# Patient Record
Sex: FEMALE | Race: WHITE | Employment: FULL TIME | URBAN - METROPOLITAN AREA
[De-identification: names, ages, dates, MRNs, and addresses within clinical notes are randomized per-mention and may not be internally consistent; named-entity substitution may affect disease eponyms.]

---

## 2017-02-02 ENCOUNTER — OFFICE VISIT (OUTPATIENT)
Dept: PERINATAL CARE | Facility: HOSPITAL | Age: 32
End: 2017-02-02
Attending: OBSTETRICS & GYNECOLOGY
Payer: COMMERCIAL

## 2017-02-02 VITALS
DIASTOLIC BLOOD PRESSURE: 59 MMHG | BODY MASS INDEX: 24 KG/M2 | WEIGHT: 142 LBS | HEART RATE: 85 BPM | SYSTOLIC BLOOD PRESSURE: 101 MMHG

## 2017-02-02 DIAGNOSIS — Z98.891 PREVIOUS CESAREAN SECTION: Primary | ICD-10-CM

## 2017-02-02 DIAGNOSIS — IMO0001 CHOROID PLEXUS CYST OF FETUS, NOT APPLICABLE OR UNSPECIFIED FETUS: ICD-10-CM

## 2017-02-02 DIAGNOSIS — D56.3 THALASSEMIA MINOR: ICD-10-CM

## 2017-02-02 DIAGNOSIS — O99.012 ANEMIA AFFECTING PREGNANCY IN SECOND TRIMESTER: ICD-10-CM

## 2017-02-02 PROCEDURE — 99242 OFF/OP CONSLTJ NEW/EST SF 20: CPT

## 2017-02-02 PROCEDURE — 83021 HEMOGLOBIN CHROMOTOGRAPHY: CPT | Performed by: OBSTETRICS & GYNECOLOGY

## 2017-02-02 NOTE — PROGRESS NOTES
Outpatient Maternal-Fetal Medicine Consultation    Dear Dr. Anderson Hernandez    Thank you for requesting ultrasound evaluation and maternal fetal medicine consultation on your patient Collins Nathan.   As you are aware she is a 34year old female  with a [place] 27.0-33.2 pg     MCHC 31.0 31.0-37.0 g/dL     Platelet Count 247 462-210 10*3/uL     RDW 16.6 (H) 11.5-16.0 %     MPV 11.6 (H) 7.0-11.5 fL     Neutrophils Absolute 6.75 (H) 1.30-6.70 10ˆ3/µL     Lymphocytes Absolute 2.93 0.90-4.00 10*3/uL     Monocytes Abs gastrointestinal tract, kidneys, bladder, extremities.     Brain: Visualized and normal appearance: brain parenchyma, cerebral ventricles, choroid plexus, Cisterna Magna, midline falx, cerebellum, cerebellar lobes, posterior fossa, vermis, cavum septi pellu genetic testing is offered. Edvin Palma BETA THALASSEMIA  Beta thalassemia minor (beta thalassemia trait) requires no specific therapy. Transfusions are occasionally required in pregnant women who may develop a more severe \"physiologic\" anemia of pregnancy. coordination of care. Our discussion is summarized above. The approximate physician face-to-face time was 30 minutes.

## 2017-03-02 PROBLEM — M53.3 SACROILIAC JOINT DYSFUNCTION: Status: ACTIVE | Noted: 2017-03-02

## 2017-03-27 PROCEDURE — 86780 TREPONEMA PALLIDUM: CPT | Performed by: OBSTETRICS & GYNECOLOGY

## 2017-03-27 PROCEDURE — 82950 GLUCOSE TEST: CPT | Performed by: OBSTETRICS & GYNECOLOGY

## 2017-03-30 PROCEDURE — 36415 COLL VENOUS BLD VENIPUNCTURE: CPT | Performed by: OBSTETRICS & GYNECOLOGY

## 2017-03-30 PROCEDURE — 82952 GTT-ADDED SAMPLES: CPT | Performed by: OBSTETRICS & GYNECOLOGY

## 2017-03-30 PROCEDURE — 82951 GLUCOSE TOLERANCE TEST (GTT): CPT | Performed by: OBSTETRICS & GYNECOLOGY

## 2017-04-05 ENCOUNTER — OFFICE VISIT (OUTPATIENT)
Dept: PERINATAL CARE | Facility: HOSPITAL | Age: 32
End: 2017-04-05
Attending: OBSTETRICS & GYNECOLOGY
Payer: COMMERCIAL

## 2017-04-05 VITALS
SYSTOLIC BLOOD PRESSURE: 103 MMHG | BODY MASS INDEX: 27 KG/M2 | DIASTOLIC BLOOD PRESSURE: 65 MMHG | HEART RATE: 74 BPM | WEIGHT: 159 LBS

## 2017-04-05 DIAGNOSIS — IMO0001 CHOROID PLEXUS CYST OF FETUS, NOT APPLICABLE OR UNSPECIFIED FETUS: ICD-10-CM

## 2017-04-05 DIAGNOSIS — Z98.891 PREVIOUS CESAREAN SECTION: ICD-10-CM

## 2017-04-05 DIAGNOSIS — D56.3 THALASSEMIA MINOR: Primary | ICD-10-CM

## 2017-04-05 PROBLEM — IMO0002 CHOROID PLEXUS CYST OF FETUS: Status: ACTIVE | Noted: 2017-04-05

## 2017-04-05 PROCEDURE — 99213 OFFICE O/P EST LOW 20 MIN: CPT

## 2017-04-05 NOTE — PROGRESS NOTES
Indication: recheck cpc.   ____________________________________________________________________________  History: Age: 34 years.  : 3 Para: 1.  ____________________________________________________________________________  Dating:  LMP: 09/15/2016 EDC abnormalities are seen today. The choroid plexus cyst not seen. She understands that ultrasound exam cannot exclude genetic abnormalities. The limitations of ultrasound were discussed. IMPRESSION:    1.  IUP at  28 6/7 wks    2.   Scan consistent wi

## 2017-04-10 PROBLEM — O99.283 HYPERTHYROIDISM AFFECTING PREGNANCY IN THIRD TRIMESTER (HCC): Status: ACTIVE | Noted: 2017-04-10

## 2017-04-10 PROBLEM — E05.90 HYPERTHYROIDISM AFFECTING PREGNANCY IN THIRD TRIMESTER (HCC): Status: ACTIVE | Noted: 2017-04-10

## 2017-04-10 PROBLEM — O99.283 HYPERTHYROIDISM AFFECTING PREGNANCY IN THIRD TRIMESTER: Status: ACTIVE | Noted: 2017-04-10

## 2017-04-10 PROBLEM — E05.90 HYPERTHYROIDISM AFFECTING PREGNANCY IN THIRD TRIMESTER: Status: ACTIVE | Noted: 2017-04-10

## 2017-04-10 PROCEDURE — 86376 MICROSOMAL ANTIBODY EACH: CPT | Performed by: INTERNAL MEDICINE

## 2017-04-10 PROCEDURE — 84436 ASSAY OF TOTAL THYROXINE: CPT | Performed by: INTERNAL MEDICINE

## 2017-04-10 PROCEDURE — 84445 ASSAY OF TSI GLOBULIN: CPT | Performed by: INTERNAL MEDICINE

## 2017-04-10 PROCEDURE — 83520 IMMUNOASSAY QUANT NOS NONAB: CPT | Performed by: INTERNAL MEDICINE

## 2017-04-10 PROCEDURE — 86800 THYROGLOBULIN ANTIBODY: CPT | Performed by: INTERNAL MEDICINE

## 2017-05-26 PROCEDURE — 87081 CULTURE SCREEN ONLY: CPT | Performed by: OBSTETRICS & GYNECOLOGY

## 2017-06-12 ENCOUNTER — TELEPHONE (OUTPATIENT)
Dept: OBGYN UNIT | Facility: HOSPITAL | Age: 32
End: 2017-06-12

## 2017-06-12 RX ORDER — GUAIFENESIN 100 MG/5ML
200 SYRUP ORAL 3 TIMES DAILY PRN
COMMUNITY
End: 2017-06-14

## 2017-06-15 NOTE — H&P
[address] and Delivery Prenatal History and Physical Interval Addendum  Please see full Prenatal Record for this pregnancy      SUBJECTIVE:    Interval History: This is a pregnancy at 39 weeks admitted for repeat  section.   Healthy

## 2017-06-16 ENCOUNTER — ANESTHESIA (OUTPATIENT)
Dept: OBGYN UNIT | Facility: HOSPITAL | Age: 32
End: 2017-06-16
Payer: COMMERCIAL

## 2017-06-16 ENCOUNTER — HOSPITAL ENCOUNTER (INPATIENT)
Facility: HOSPITAL | Age: 32
LOS: 3 days | Discharge: HOME | End: 2017-06-19
Attending: OBSTETRICS & GYNECOLOGY | Admitting: OBSTETRICS & GYNECOLOGY
Payer: COMMERCIAL

## 2017-06-16 ENCOUNTER — SURGERY (OUTPATIENT)
Age: 32
End: 2017-06-16

## 2017-06-16 ENCOUNTER — ANESTHESIA EVENT (OUTPATIENT)
Dept: OBGYN UNIT | Facility: HOSPITAL | Age: 32
End: 2017-06-16
Payer: COMMERCIAL

## 2017-06-16 PROBLEM — Z34.90 PREGNANCY (HCC): Status: ACTIVE | Noted: 2017-06-16

## 2017-06-16 PROBLEM — Z34.90 PREGNANCY: Status: ACTIVE | Noted: 2017-06-16

## 2017-06-16 PROCEDURE — 86780 TREPONEMA PALLIDUM: CPT | Performed by: OBSTETRICS & GYNECOLOGY

## 2017-06-16 PROCEDURE — 85025 COMPLETE CBC W/AUTO DIFF WBC: CPT | Performed by: OBSTETRICS & GYNECOLOGY

## 2017-06-16 PROCEDURE — 86901 BLOOD TYPING SEROLOGIC RH(D): CPT | Performed by: OBSTETRICS & GYNECOLOGY

## 2017-06-16 PROCEDURE — 86900 BLOOD TYPING SEROLOGIC ABO: CPT | Performed by: OBSTETRICS & GYNECOLOGY

## 2017-06-16 PROCEDURE — 86850 RBC ANTIBODY SCREEN: CPT | Performed by: OBSTETRICS & GYNECOLOGY

## 2017-06-16 PROCEDURE — 81002 URINALYSIS NONAUTO W/O SCOPE: CPT

## 2017-06-16 RX ORDER — DEXTROSE, SODIUM CHLORIDE, SODIUM LACTATE, POTASSIUM CHLORIDE, AND CALCIUM CHLORIDE 5; .6; .31; .03; .02 G/100ML; G/100ML; G/100ML; G/100ML; G/100ML
INJECTION, SOLUTION INTRAVENOUS CONTINUOUS
Status: DISCONTINUED | OUTPATIENT
Start: 2017-06-16 | End: 2017-06-19

## 2017-06-16 RX ORDER — NALOXONE HYDROCHLORIDE 0.4 MG/ML
0.08 INJECTION, SOLUTION INTRAMUSCULAR; INTRAVENOUS; SUBCUTANEOUS
Status: ACTIVE | OUTPATIENT
Start: 2017-06-16 | End: 2017-06-17

## 2017-06-16 RX ORDER — HYDROMORPHONE HYDROCHLORIDE 1 MG/ML
0.4 INJECTION, SOLUTION INTRAMUSCULAR; INTRAVENOUS; SUBCUTANEOUS EVERY 5 MIN PRN
Status: DISCONTINUED | OUTPATIENT
Start: 2017-06-16 | End: 2017-06-16 | Stop reason: HOSPADM

## 2017-06-16 RX ORDER — DEXTROMETHORPHAN POLISTIREX 30 MG/5ML
10 SUSPENSION ORAL 2 TIMES DAILY PRN
Status: DISCONTINUED | OUTPATIENT
Start: 2017-06-16 | End: 2017-06-16

## 2017-06-16 RX ORDER — DIPHENHYDRAMINE HYDROCHLORIDE 50 MG/ML
25 INJECTION INTRAMUSCULAR; INTRAVENOUS ONCE AS NEEDED
Status: ACTIVE | OUTPATIENT
Start: 2017-06-16 | End: 2017-06-16

## 2017-06-16 RX ORDER — HYDROCODONE BITARTRATE AND ACETAMINOPHEN 5; 325 MG/1; MG/1
1 TABLET ORAL EVERY 4 HOURS PRN
Status: DISCONTINUED | OUTPATIENT
Start: 2017-06-16 | End: 2017-06-19

## 2017-06-16 RX ORDER — OXYTOCIN 10 [USP'U]/ML
INJECTION, SOLUTION INTRAMUSCULAR; INTRAVENOUS
Status: DISCONTINUED | OUTPATIENT
Start: 2017-06-16 | End: 2017-06-16 | Stop reason: HOSPADM

## 2017-06-16 RX ORDER — GUAIFENESIN/DEXTROMETHORPHAN 100-10MG/5
100 SYRUP ORAL EVERY 4 HOURS PRN
Status: DISCONTINUED | OUTPATIENT
Start: 2017-06-16 | End: 2017-06-19

## 2017-06-16 RX ORDER — HYDROMORPHONE HYDROCHLORIDE 1 MG/ML
0.4 INJECTION, SOLUTION INTRAMUSCULAR; INTRAVENOUS; SUBCUTANEOUS EVERY 2 HOUR PRN
Status: ACTIVE | OUTPATIENT
Start: 2017-06-16 | End: 2017-06-17

## 2017-06-16 RX ORDER — DIPHENHYDRAMINE HYDROCHLORIDE 50 MG/ML
12.5 INJECTION INTRAMUSCULAR; INTRAVENOUS EVERY 4 HOURS PRN
Status: DISCONTINUED | OUTPATIENT
Start: 2017-06-16 | End: 2017-06-19

## 2017-06-16 RX ORDER — CEFAZOLIN SODIUM 1 G/3ML
INJECTION, POWDER, FOR SOLUTION INTRAMUSCULAR; INTRAVENOUS
Status: DISCONTINUED | OUTPATIENT
Start: 2017-06-16 | End: 2017-06-16 | Stop reason: HOSPADM

## 2017-06-16 RX ORDER — IBUPROFEN 600 MG/1
600 TABLET ORAL EVERY 6 HOURS SCHEDULED
Status: DISCONTINUED | OUTPATIENT
Start: 2017-06-17 | End: 2017-06-19

## 2017-06-16 RX ORDER — KETOROLAC TROMETHAMINE 30 MG/ML
30 INJECTION, SOLUTION INTRAMUSCULAR; INTRAVENOUS EVERY 6 HOURS PRN
Status: DISPENSED | OUTPATIENT
Start: 2017-06-16 | End: 2017-06-17

## 2017-06-16 RX ORDER — METOCLOPRAMIDE HYDROCHLORIDE 5 MG/ML
INJECTION INTRAMUSCULAR; INTRAVENOUS
Status: DISCONTINUED | OUTPATIENT
Start: 2017-06-16 | End: 2017-06-16 | Stop reason: HOSPADM

## 2017-06-16 RX ORDER — DIPHENHYDRAMINE HCL 25 MG
25 CAPSULE ORAL EVERY 4 HOURS PRN
Status: DISCONTINUED | OUTPATIENT
Start: 2017-06-16 | End: 2017-06-19

## 2017-06-16 RX ORDER — ZOLPIDEM TARTRATE 5 MG/1
5 TABLET ORAL NIGHTLY PRN
Status: DISCONTINUED | OUTPATIENT
Start: 2017-06-16 | End: 2017-06-19

## 2017-06-16 RX ORDER — SIMETHICONE 80 MG
80 TABLET,CHEWABLE ORAL 3 TIMES DAILY PRN
Status: DISCONTINUED | OUTPATIENT
Start: 2017-06-16 | End: 2017-06-19

## 2017-06-16 RX ORDER — SODIUM CHLORIDE, SODIUM LACTATE, POTASSIUM CHLORIDE, CALCIUM CHLORIDE 600; 310; 30; 20 MG/100ML; MG/100ML; MG/100ML; MG/100ML
INJECTION, SOLUTION INTRAVENOUS CONTINUOUS
Status: DISCONTINUED | OUTPATIENT
Start: 2017-06-16 | End: 2017-06-19

## 2017-06-16 RX ORDER — DOCUSATE SODIUM 100 MG/1
100 CAPSULE, LIQUID FILLED ORAL
Status: DISCONTINUED | OUTPATIENT
Start: 2017-06-17 | End: 2017-06-19

## 2017-06-16 RX ORDER — MORPHINE SULFATE 0.5 MG/ML
0.2 INJECTION, SOLUTION EPIDURAL; INTRATHECAL; INTRAVENOUS ONCE
Status: DISCONTINUED | OUTPATIENT
Start: 2017-06-16 | End: 2017-06-16 | Stop reason: HOSPADM

## 2017-06-16 RX ORDER — SODIUM CHLORIDE, SODIUM LACTATE, POTASSIUM CHLORIDE, CALCIUM CHLORIDE 600; 310; 30; 20 MG/100ML; MG/100ML; MG/100ML; MG/100ML
INJECTION, SOLUTION INTRAVENOUS CONTINUOUS
Status: DISCONTINUED | OUTPATIENT
Start: 2017-06-16 | End: 2017-06-16 | Stop reason: HOSPADM

## 2017-06-16 RX ORDER — METOCLOPRAMIDE HYDROCHLORIDE 5 MG/ML
10 INJECTION INTRAMUSCULAR; INTRAVENOUS EVERY 6 HOURS PRN
Status: DISCONTINUED | OUTPATIENT
Start: 2017-06-16 | End: 2017-06-19

## 2017-06-16 RX ORDER — NALBUPHINE HCL 10 MG/ML
2.5 AMPUL (ML) INJECTION
Status: DISCONTINUED | OUTPATIENT
Start: 2017-06-16 | End: 2017-06-16 | Stop reason: HOSPADM

## 2017-06-16 RX ORDER — NALBUPHINE HCL 10 MG/ML
2.5 AMPUL (ML) INJECTION EVERY 4 HOURS PRN
Status: DISCONTINUED | OUTPATIENT
Start: 2017-06-16 | End: 2017-06-19

## 2017-06-16 RX ORDER — HYDROCODONE BITARTRATE AND ACETAMINOPHEN 10; 325 MG/1; MG/1
1 TABLET ORAL EVERY 4 HOURS PRN
Status: DISCONTINUED | OUTPATIENT
Start: 2017-06-16 | End: 2017-06-19

## 2017-06-16 RX ORDER — ONDANSETRON 2 MG/ML
4 INJECTION INTRAMUSCULAR; INTRAVENOUS ONCE AS NEEDED
Status: ACTIVE | OUTPATIENT
Start: 2017-06-16 | End: 2017-06-16

## 2017-06-16 RX ORDER — BISACODYL 10 MG
10 SUPPOSITORY, RECTAL RECTAL
Status: DISCONTINUED | OUTPATIENT
Start: 2017-06-16 | End: 2017-06-19

## 2017-06-16 RX ORDER — KETOROLAC TROMETHAMINE 30 MG/ML
30 INJECTION, SOLUTION INTRAMUSCULAR; INTRAVENOUS ONCE AS NEEDED
Status: COMPLETED | OUTPATIENT
Start: 2017-06-16 | End: 2017-06-16

## 2017-06-16 RX ORDER — ONDANSETRON 2 MG/ML
4 INJECTION INTRAMUSCULAR; INTRAVENOUS EVERY 6 HOURS PRN
Status: DISCONTINUED | OUTPATIENT
Start: 2017-06-16 | End: 2017-06-19

## 2017-06-16 NOTE — PROGRESS NOTES
Pt is a 34year old female admitted to triage 4for Patient presents with:  Scheduled   . Pt is 39w1d intra-uterine pregnancy. History obtained, consents signed. Oriented to room, staff, and plan of care.

## 2017-06-16 NOTE — PLAN OF CARE
Problem: SAFETY ADULT - FALL  Goal: Free from fall injury  INTERVENTIONS:  - Assess pt frequently for physical needs  - Identify cognitive and physical deficits and behaviors that affect risk of falls.   - [place] fall precautions as indicated by assessme

## 2017-06-16 NOTE — ANESTHESIA PREPROCEDURE EVALUATION
PRE-OP EVALUATION    Patient Name: Mary Alberts    Pre-op Diagnosis: *S/P C/S @ 39+WKS    Procedure(s):  ELECTIVE REPEAT     Surgeon(s) and Role:     Cristobal Tovar MD - Primary      Pre-op vitals reviewed.   Temp: 99 °F (37.2 °C)  Puls Smoking status: Never Smoker     Smokeless tobacco: Never Used    Alcohol Use: No       Drug Use: No     Available pre-op labs reviewed.     Lab Results  Component Value Date   WBC 13.9* 06/16/2017   RBC 5.43* 06/16/2017   HGB 10.9* 06/16/2017   HGB

## 2017-06-16 NOTE — OPERATIVE REPORT
PREOPERATIVE DIAGNOSIS:   1. 39 week pregnancy  2. Prior      POSTOPERATIVE DIAGNOSIS:   same    PROCEDURE PERFORMED: repeat low transverse  section.      SURGEON: David Baker MD    ASSISTANT: Colin Emerson MD    ANESTHESIA: Spinal.     F a   continuous running fashion. Subcutaneous tissue was irrigated,   dried, and found to be hemostatic. 2-0 plain gut used to reapproximate the subcutaneous fat. Skin was closed with 4-0 Vicryl   in a subcuticular fashion.  Mastisol and Steri-Strips were ap

## 2017-06-16 NOTE — ANESTHESIA POSTPROCEDURE EVALUATION
[address] Patient Status:  Inpatient   Age/Gender 34year old female MRN [id number]   [facility] [place] 1NW-A Attending Kayode Hedrick MD   [facility] Day # 0 PCP Emily Roth MD       Anesthesia Post-op Note    Procedur

## 2017-06-16 NOTE — PROGRESS NOTES
Patient transferred to mother/baby room per cart in stable condition with baby and personal belongings. Accompanied by  and staff. Report given to mother/baby RN.

## 2017-06-17 PROCEDURE — 85025 COMPLETE CBC W/AUTO DIFF WBC: CPT | Performed by: OBSTETRICS & GYNECOLOGY

## 2017-06-17 PROCEDURE — 87086 URINE CULTURE/COLONY COUNT: CPT | Performed by: OBSTETRICS & GYNECOLOGY

## 2017-06-17 PROCEDURE — 81001 URINALYSIS AUTO W/SCOPE: CPT | Performed by: OBSTETRICS & GYNECOLOGY

## 2017-06-17 NOTE — PROGRESS NOTES
ASSISTED PT UP TO BR TO VOID WITHOUT DIFFICULTY. SEE I&O FLOW SHEET FOR AMOUNT OF VOIDED URINE. PERICARE REINFORCED. PT TOLERATED UP WELL.

## 2017-06-17 NOTE — PROGRESS NOTES
[facility]    Patients Name: Mary Alberts  Attending Physician: Denver Ceja MD  CSN: [id number]    Location:  2216/2216-A  MRN: [id number]    YOB: 1985  Admission Date: 6/16/2017     Obstetric Anesthesia Pain Progress Note

## 2017-06-17 NOTE — PROGRESS NOTES
[facility]  Post-Partum Caesarean Section Progress Note    Nataly Daniels Patient Status:  Inpatient    1985 MRN [id number]   [facility] 2SW-J Attending Tyson Stewart MD   Hosp Day # 1 PCP Shruti Rao MD     [address]

## 2017-06-18 NOTE — PROGRESS NOTES
[facility]  Post-Partum Caesarean Section Progress Note    Vinita Parisi Patient Status:  Inpatient    1985 MRN [id number]   [facility] 2SW-J Attending Windy Gordon MD   Hosp Day # 2 PCP Rose Foster MD     [address]

## 2017-06-19 VITALS
RESPIRATION RATE: 18 BRPM | DIASTOLIC BLOOD PRESSURE: 76 MMHG | OXYGEN SATURATION: 96 % | SYSTOLIC BLOOD PRESSURE: 131 MMHG | TEMPERATURE: 98 F | HEIGHT: 64.02 IN | HEART RATE: 55 BPM

## 2017-06-19 RX ORDER — HYDROCODONE BITARTRATE AND ACETAMINOPHEN 5; 325 MG/1; MG/1
1-2 TABLET ORAL EVERY 4 HOURS PRN
Qty: 30 TABLET | Refills: 0 | Status: SHIPPED | OUTPATIENT
Start: 2017-06-19 | End: 2017-06-30

## 2017-06-19 NOTE — PROGRESS NOTES
S: pt without complaints.   Positive flatus  O: VS-Temp:  [98.2 °F (36.8 °C)-98.7 °F (37.1 °C)] 98.2 °F (36.8 °C)  Pulse:  [75-76] 76  Resp:  [17-18] 18  BP: (119-132)/(69-81) 132/81 mmHg       Abdomen: soft, ND, NT  Incision- CDI       Extremities: tr nayeli

## 2017-06-19 NOTE — PROGRESS NOTES
[facility] with spouse,  and PCT escort. Pt verbalizes understanding of all discharge instructions at this time.

## 2017-06-20 PROBLEM — IMO0002 CHOROID PLEXUS CYST OF FETUS: Status: RESOLVED | Noted: 2017-04-05 | Resolved: 2017-06-16

## 2017-06-22 NOTE — DISCHARGE SUMMARY
Admission date: 17  Discharge date: 17  Admission diagnosis: term pregnancy, previous  section  Discharge diagnosis: same  Operative Procedure: repeat low transverse  section  Hospital course: uncomplicated  Discharge medications:

## 2017-06-24 ENCOUNTER — TELEPHONE (OUTPATIENT)
Dept: OBGYN UNIT | Facility: HOSPITAL | Age: 32
End: 2017-06-24

## 2017-06-24 NOTE — PROGRESS NOTES
Outgoing cradle call completed. Mom reports that she and infant are doing well. No complaints of PPB or PPD. Has had pediatrician F/U visit. Has PP F/U visit scheduled. Reviewed basic infant and self care; verbalizes understanding. Encouraged to follow-up with MDs as directed with any further health questions regarding herself and infant.

## 2017-06-28 ENCOUNTER — NURSE ONLY (OUTPATIENT)
Dept: LACTATION | Facility: HOSPITAL | Age: 32
End: 2017-06-28
Attending: OBSTETRICS & GYNECOLOGY
Payer: COMMERCIAL

## 2017-06-28 PROCEDURE — 99213 OFFICE O/P EST LOW 20 MIN: CPT

## 2017-06-28 NOTE — PATIENT INSTRUCTIONS
Outpatient Lactation Visit for Yadira Hernandez  June 28, 2017    Today's weight in diaper only: 8 lb 11.3 oz  Intake from both breasts: 46 ml right breast, 12 ml left = 58 ml, almost 2 ounces  At her current weight, an average feeding might be ab

## 2017-07-08 ENCOUNTER — NURSE ONLY (OUTPATIENT)
Dept: LACTATION | Facility: HOSPITAL | Age: 32
End: 2017-07-08
Attending: OBSTETRICS & GYNECOLOGY
Payer: COMMERCIAL

## 2017-07-08 PROCEDURE — 99212 OFFICE O/P EST SF 10 MIN: CPT

## 2017-07-31 PROCEDURE — 87624 HPV HI-RISK TYP POOLED RSLT: CPT | Performed by: OBSTETRICS & GYNECOLOGY

## 2017-07-31 PROCEDURE — 88175 CYTOPATH C/V AUTO FLUID REDO: CPT | Performed by: OBSTETRICS & GYNECOLOGY

## 2017-10-05 PROBLEM — R10.2 PELVIC PAIN IN FEMALE: Status: ACTIVE | Noted: 2017-10-05

## 2018-01-04 PROBLEM — E05.90 HYPERTHYROIDISM AFFECTING PREGNANCY IN THIRD TRIMESTER (HCC): Status: RESOLVED | Noted: 2017-04-10 | Resolved: 2018-01-04

## 2018-01-04 PROBLEM — O99.283 HYPERTHYROIDISM AFFECTING PREGNANCY IN THIRD TRIMESTER: Status: RESOLVED | Noted: 2017-04-10 | Resolved: 2018-01-04

## 2018-01-04 PROBLEM — O99.283 HYPERTHYROIDISM AFFECTING PREGNANCY IN THIRD TRIMESTER (HCC): Status: RESOLVED | Noted: 2017-04-10 | Resolved: 2018-01-04

## 2018-01-04 PROBLEM — E05.90 SUBCLINICAL HYPERTHYROIDISM: Status: ACTIVE | Noted: 2018-01-04

## 2018-01-04 PROBLEM — E05.90 HYPERTHYROIDISM AFFECTING PREGNANCY IN THIRD TRIMESTER: Status: RESOLVED | Noted: 2017-04-10 | Resolved: 2018-01-04

## 2018-01-05 PROCEDURE — 83520 IMMUNOASSAY QUANT NOS NONAB: CPT | Performed by: INTERNAL MEDICINE

## 2018-01-05 PROCEDURE — 86800 THYROGLOBULIN ANTIBODY: CPT | Performed by: INTERNAL MEDICINE

## 2018-01-05 PROCEDURE — 84445 ASSAY OF TSI GLOBULIN: CPT | Performed by: INTERNAL MEDICINE

## 2018-01-05 PROCEDURE — 86376 MICROSOMAL ANTIBODY EACH: CPT | Performed by: INTERNAL MEDICINE

## 2018-05-08 ENCOUNTER — OFFICE VISIT (OUTPATIENT)
Dept: FAMILY MEDICINE CLINIC | Facility: CLINIC | Age: 33
End: 2018-05-08

## 2018-05-08 VITALS
TEMPERATURE: 98 F | RESPIRATION RATE: 14 BRPM | OXYGEN SATURATION: 97 % | HEART RATE: 85 BPM | DIASTOLIC BLOOD PRESSURE: 64 MMHG | WEIGHT: 140 LBS | HEIGHT: 63 IN | SYSTOLIC BLOOD PRESSURE: 110 MMHG | BODY MASS INDEX: 24.8 KG/M2

## 2018-05-08 DIAGNOSIS — H66.001 ACUTE SUPPURATIVE OTITIS MEDIA OF RIGHT EAR WITHOUT SPONTANEOUS RUPTURE OF TYMPANIC MEMBRANE, RECURRENCE NOT SPECIFIED: Primary | ICD-10-CM

## 2018-05-08 DIAGNOSIS — J00 ACUTE NASOPHARYNGITIS: ICD-10-CM

## 2018-05-08 DIAGNOSIS — H65.192 OTHER ACUTE NONSUPPURATIVE OTITIS MEDIA OF LEFT EAR, RECURRENCE NOT SPECIFIED: ICD-10-CM

## 2018-05-08 PROCEDURE — 99202 OFFICE O/P NEW SF 15 MIN: CPT | Performed by: NURSE PRACTITIONER

## 2018-05-08 RX ORDER — FLUTICASONE PROPIONATE 50 MCG
2 SPRAY, SUSPENSION (ML) NASAL DAILY
Qty: 1 BOTTLE | Refills: 0 | Status: SHIPPED | OUTPATIENT
Start: 2018-05-08 | End: 2018-05-22

## 2018-05-08 RX ORDER — AMOXICILLIN 875 MG/1
875 TABLET, COATED ORAL 2 TIMES DAILY
Qty: 20 TABLET | Refills: 0 | Status: SHIPPED | OUTPATIENT
Start: 2018-05-08 | End: 2018-05-18

## 2018-05-08 NOTE — PROGRESS NOTES
CHIEF COMPLAINT:   Patient presents with:  Ear Pain: pain in right ear, sinus pressure right side x 2 dys constant ringing in ear x 1 dy       HPI:   Clarissa Amezcua is a 28year old female who presents to clinic today with complaints of right ear landmarks not visualized. Left TM: erythematous, + bulging, no retraction, no effusion, bony landmarks not visualized.    NOSE: nostrils patent, clear nasal discharge, nasal mucosa pink and noninflamed  THROAT: oral mucosa pink, moist. Posterior pharynx hours.          Middle Ear Infection (Otitis Media)   What is a middle ear infection? A middle ear infection is an infection of the air-filled space in the ear behind the eardrum.  Anyone can get an ear infection, but ear infections are more common in chi decongestant (tablets or a nasal spray) to help clear the eustachian tube. This may help relieve pressure in the middle ear. For pain take a nonprescription pain reliever such as acetaminophen (Tylenol) or ibuprofen.  Carefully follow the directions for usi (38.6 degrees C) or higher that persists even after you take acetaminophen, aspirin, or ibuprofen   a severe headache or worsening pain around the ear   swelling around the ear   increasing dizziness   worsening of your hearing   weakness of one side of yo

## 2019-02-23 ENCOUNTER — OFFICE VISIT (OUTPATIENT)
Dept: FAMILY MEDICINE CLINIC | Facility: CLINIC | Age: 34
End: 2019-02-23
Payer: COMMERCIAL

## 2019-02-23 VITALS
DIASTOLIC BLOOD PRESSURE: 64 MMHG | RESPIRATION RATE: 20 BRPM | TEMPERATURE: 98 F | WEIGHT: 143.38 LBS | HEIGHT: 64 IN | BODY MASS INDEX: 24.48 KG/M2 | OXYGEN SATURATION: 99 % | HEART RATE: 98 BPM | SYSTOLIC BLOOD PRESSURE: 98 MMHG

## 2019-02-23 DIAGNOSIS — J01.00 ACUTE NON-RECURRENT MAXILLARY SINUSITIS: Primary | ICD-10-CM

## 2019-02-23 PROCEDURE — 99213 OFFICE O/P EST LOW 20 MIN: CPT | Performed by: NURSE PRACTITIONER

## 2019-02-23 RX ORDER — FLUTICASONE PROPIONATE 50 MCG
2 SPRAY, SUSPENSION (ML) NASAL DAILY
Qty: 1 INHALER | Refills: 0 | Status: SHIPPED | OUTPATIENT
Start: 2019-02-23 | End: 2020-08-03 | Stop reason: ALTCHOICE

## 2019-02-23 RX ORDER — AMOXICILLIN AND CLAVULANATE POTASSIUM 875; 125 MG/1; MG/1
1 TABLET, FILM COATED ORAL 2 TIMES DAILY
Qty: 20 TABLET | Refills: 0 | Status: SHIPPED | OUTPATIENT
Start: 2019-02-23 | End: 2019-03-05

## 2019-02-23 NOTE — PROGRESS NOTES
CHIEF COMPLAINT:   Patient presents with:  Sinus Problem: sinus congestion, both ear pain, and clogged x4wks    HPI:   Sonya Lehman is a 27year old female who presents for sinus congestion for  4  months. Symptoms have been worsening since onset.  Venida Lamp SKIN: no rashes,no suspicious lesions  HEAD: atraumatic, normocephalic,  + tenderness on palpation of right maxillary sinuses  EYES: conjunctiva clear, EOM intact  EARS: TM's clear gray, no bulging, no retraction, + fluid, bony landmarks intact  NOSE: nost The sinuses are air-filled spaces within the bones of the face. They connect to the inside of the nose. Sinusitis is an inflammation of the tissue that lines the sinuses. Sinusitis can occur during a cold.  It can also happen due to allergies to pollens and · Do not use nasal rinses or irrigation during an acute sinus infection, unless your healthcare provider tells you to. Rinsing may spread the infection to other areas in your sinuses.   · Use acetaminophen or ibuprofen to control pain, unless another pain m

## 2019-10-17 ENCOUNTER — OFFICE VISIT (OUTPATIENT)
Dept: FAMILY MEDICINE CLINIC | Facility: CLINIC | Age: 34
End: 2019-10-17
Payer: COMMERCIAL

## 2019-10-17 VITALS
BODY MASS INDEX: 24.8 KG/M2 | HEART RATE: 84 BPM | WEIGHT: 140 LBS | RESPIRATION RATE: 16 BRPM | TEMPERATURE: 99 F | OXYGEN SATURATION: 98 % | DIASTOLIC BLOOD PRESSURE: 70 MMHG | HEIGHT: 63 IN | SYSTOLIC BLOOD PRESSURE: 118 MMHG

## 2019-10-17 DIAGNOSIS — J02.9 SORE THROAT: ICD-10-CM

## 2019-10-17 DIAGNOSIS — J02.0 STREP THROAT: Primary | ICD-10-CM

## 2019-10-17 PROCEDURE — 87880 STREP A ASSAY W/OPTIC: CPT | Performed by: PHYSICIAN ASSISTANT

## 2019-10-17 PROCEDURE — 99213 OFFICE O/P EST LOW 20 MIN: CPT | Performed by: PHYSICIAN ASSISTANT

## 2019-10-17 RX ORDER — AMOXICILLIN 500 MG/1
500 CAPSULE ORAL 2 TIMES DAILY
Qty: 20 CAPSULE | Refills: 0 | Status: SHIPPED | OUTPATIENT
Start: 2019-10-17 | End: 2019-10-27

## 2019-10-17 NOTE — PROGRESS NOTES
CHIEF COMPLAINT:   Patient presents with:  Sore Throat: body aches x 2 dys       HPI:   Finn Giang is a 27year old female who presents with sore throat. Symptoms have been persistent since onset.  Patient reports no nasal discharge, nocough, no si normocephalic, non tenderness on palpation of maxillary sinuses, no frontal sinus tenderness  EYES: conjunctiva clear, EOM intact  EARS: TM's with no erythema, bulging, or retraction bilaterally, no fluid behind TM's bilaterally, bony landmarks intact  NOS understands and agrees with plan.      Nia Munson PA-C  10/17/2019  4:00 PM

## 2019-10-17 NOTE — PATIENT INSTRUCTIONS
Pharyngitis: Strep (Confirmed)    You have had a positive test for strep throat. Strep throat is a contagious illness. It is spread by coughing, kissing or by touching others after touching your mouth or nose.  Symptoms include throat pain that is worse w · Lymph nodes getting larger or becoming soft in the middle  · You can't swallow liquids or you can't open your mouth wide because of throat pain  · Signs of dehydration. These include very dark urine or no urine, sunken eyes, and dizziness.   · Trouble willy

## 2020-06-30 ENCOUNTER — TELEMEDICINE (OUTPATIENT)
Dept: TELEHEALTH | Age: 35
End: 2020-06-30

## 2020-06-30 DIAGNOSIS — Z02.9 ENCOUNTERS FOR UNSPECIFIED ADMINISTRATIVE PURPOSE: Primary | ICD-10-CM

## 2020-08-03 ENCOUNTER — OFFICE VISIT (OUTPATIENT)
Dept: FAMILY MEDICINE CLINIC | Facility: CLINIC | Age: 35
End: 2020-08-03
Payer: COMMERCIAL

## 2020-08-03 VITALS
DIASTOLIC BLOOD PRESSURE: 70 MMHG | HEIGHT: 63 IN | RESPIRATION RATE: 16 BRPM | BODY MASS INDEX: 22.5 KG/M2 | OXYGEN SATURATION: 98 % | HEART RATE: 68 BPM | WEIGHT: 127 LBS | SYSTOLIC BLOOD PRESSURE: 98 MMHG | TEMPERATURE: 98 F

## 2020-08-03 DIAGNOSIS — J30.2 SEASONAL ALLERGIES: ICD-10-CM

## 2020-08-03 DIAGNOSIS — E05.90 SUBCLINICAL HYPERTHYROIDISM: Primary | ICD-10-CM

## 2020-08-03 DIAGNOSIS — K64.9 HEMORRHOIDS, UNSPECIFIED HEMORRHOID TYPE: ICD-10-CM

## 2020-08-03 DIAGNOSIS — K59.09 OTHER CONSTIPATION: ICD-10-CM

## 2020-08-03 PROCEDURE — 3078F DIAST BP <80 MM HG: CPT | Performed by: FAMILY MEDICINE

## 2020-08-03 PROCEDURE — 3074F SYST BP LT 130 MM HG: CPT | Performed by: FAMILY MEDICINE

## 2020-08-03 PROCEDURE — 99203 OFFICE O/P NEW LOW 30 MIN: CPT | Performed by: FAMILY MEDICINE

## 2020-08-03 PROCEDURE — 3008F BODY MASS INDEX DOCD: CPT | Performed by: FAMILY MEDICINE

## 2020-08-03 NOTE — PROGRESS NOTES
HPI:   Javed Nielsen is a 38year old female who presents to establish care     Pt has h/o subclinical hyperthyroidism - has been monitored for years   No current thyroid symptoms   Labs at work 2/2020     H/o thalassemia minor   Labs have been stabl Wt 127 lb (57.6 kg)   LMP 07/09/2020 (Approximate)   SpO2 98%   BMI 22.50 kg/m²   Body mass index is 22.5 kg/m².    GENERAL: alert and oriented X 3, well developed, well nourished,in no apparent distress  CARDIO: RRR without murmur  LUNGS: clear to auscult

## 2020-08-11 PROBLEM — Z34.90 PREGNANCY (HCC): Status: RESOLVED | Noted: 2017-06-16 | Resolved: 2020-08-11

## 2020-08-11 PROBLEM — Z34.90 PREGNANCY: Status: RESOLVED | Noted: 2017-06-16 | Resolved: 2020-08-11

## 2020-09-08 ENCOUNTER — LAB ENCOUNTER (OUTPATIENT)
Dept: LAB | Facility: HOSPITAL | Age: 35
End: 2020-09-08
Attending: INTERNAL MEDICINE
Payer: COMMERCIAL

## 2020-09-08 DIAGNOSIS — O09.299 HISTORY OF MISCARRIAGE, CURRENTLY PREGNANT: ICD-10-CM

## 2020-09-08 LAB
B-HCG SERPL-ACNC: 2619 MIU/ML
PROGEST SERPL-MCNC: 38.2 NG/ML

## 2020-09-08 PROCEDURE — 84144 ASSAY OF PROGESTERONE: CPT

## 2020-09-08 PROCEDURE — 36415 COLL VENOUS BLD VENIPUNCTURE: CPT

## 2020-09-08 PROCEDURE — 84702 CHORIONIC GONADOTROPIN TEST: CPT

## 2020-09-10 ENCOUNTER — LAB ENCOUNTER (OUTPATIENT)
Dept: LAB | Facility: HOSPITAL | Age: 35
End: 2020-09-10
Attending: OBSTETRICS & GYNECOLOGY
Payer: COMMERCIAL

## 2020-09-10 DIAGNOSIS — O09.299 HISTORY OF MISCARRIAGE, CURRENTLY PREGNANT: ICD-10-CM

## 2020-09-10 LAB — B-HCG SERPL-ACNC: 5722 MIU/ML

## 2020-09-10 PROCEDURE — 84702 CHORIONIC GONADOTROPIN TEST: CPT

## 2020-09-10 PROCEDURE — 36415 COLL VENOUS BLD VENIPUNCTURE: CPT

## 2020-10-22 ENCOUNTER — TELEPHONE (OUTPATIENT)
Dept: FAMILY MEDICINE CLINIC | Facility: CLINIC | Age: 35
End: 2020-10-22

## 2020-10-22 NOTE — TELEPHONE ENCOUNTER
Per Dr. Estela Nova pt needs appt to discuss labs that were faxed to office. LMTCB Copy in upcoming appt folder.

## 2020-10-26 PROBLEM — Z34.81 PRENATAL CARE, SUBSEQUENT PREGNANCY, FIRST TRIMESTER: Status: ACTIVE | Noted: 2020-10-26

## 2020-10-26 PROBLEM — Z34.81 PRENATAL CARE, SUBSEQUENT PREGNANCY, FIRST TRIMESTER (HCC): Status: ACTIVE | Noted: 2020-10-26

## 2020-10-26 PROBLEM — O09.529 ADVANCED MATERNAL AGE IN MULTIGRAVIDA, UNSPECIFIED TRIMESTER (HCC): Status: ACTIVE | Noted: 2020-10-26

## 2020-10-26 PROBLEM — O34.219 PREVIOUS CESAREAN DELIVERY AFFECTING PREGNANCY (HCC): Status: ACTIVE | Noted: 2020-10-26

## 2020-10-26 PROBLEM — O34.219 PREVIOUS CESAREAN DELIVERY AFFECTING PREGNANCY: Status: ACTIVE | Noted: 2020-10-26

## 2020-10-26 PROBLEM — O09.529 ADVANCED MATERNAL AGE IN MULTIGRAVIDA, UNSPECIFIED TRIMESTER: Status: ACTIVE | Noted: 2020-10-26

## 2021-01-27 NOTE — TELEPHONE ENCOUNTER
Pt's labs scanned in chart from October show a low hgb/hct. Pt has been seeing ob/gyn,  They did order repeat labs yesterday. Do you still need to see pt?

## 2021-02-11 DIAGNOSIS — Z23 NEED FOR VACCINATION: ICD-10-CM

## 2021-02-26 ENCOUNTER — LABORATORY ENCOUNTER (OUTPATIENT)
Dept: LAB | Facility: HOSPITAL | Age: 36
End: 2021-02-26
Attending: OBSTETRICS & GYNECOLOGY
Payer: COMMERCIAL

## 2021-02-26 DIAGNOSIS — O99.810 ABNORMAL MATERNAL GLUCOSE TOLERANCE, ANTEPARTUM: ICD-10-CM

## 2021-02-26 LAB
1 HR GLUCOSE GESTATIONAL: 142 MG/DL
GLUCOSE 1H P GLC SERPL-MCNC: 121 MG/DL
GLUCOSE 3H P GLC SERPL-MCNC: 97 MG/DL
GLUCOSE BLD-MCNC: 103 MG/DL (ref 70–99)
GLUCOSE P FAST SERPL-MCNC: 85 MG/DL

## 2021-02-26 PROCEDURE — 82962 GLUCOSE BLOOD TEST: CPT

## 2021-02-26 PROCEDURE — 82951 GLUCOSE TOLERANCE TEST (GTT): CPT

## 2021-02-26 PROCEDURE — 82952 GTT-ADDED SAMPLES: CPT

## 2021-02-26 PROCEDURE — 36415 COLL VENOUS BLD VENIPUNCTURE: CPT

## 2021-03-18 NOTE — PROGRESS NOTES
Amando Cantu     Dear Dr. Zeenat Garcia,     Thank you for requesting ultrasound evaluation and maternal fetal medicine consultation on your patient Dago Katz.   As you are aware she is a 39year old female [id number] with a discussed risks for fetal growth abnormalities, hypertensive complications, maternal hospitalizations,  mortality and  delivery. We discussed the plan of care and the rationale for the increased surveillance.   We reviewed the signs and sy

## 2021-03-25 ENCOUNTER — OFFICE VISIT (OUTPATIENT)
Dept: PERINATAL CARE | Facility: HOSPITAL | Age: 36
End: 2021-03-25
Attending: OBSTETRICS & GYNECOLOGY
Payer: COMMERCIAL

## 2021-03-25 VITALS
HEART RATE: 90 BPM | BODY MASS INDEX: 27.31 KG/M2 | SYSTOLIC BLOOD PRESSURE: 114 MMHG | WEIGHT: 160 LBS | DIASTOLIC BLOOD PRESSURE: 76 MMHG | HEIGHT: 64 IN

## 2021-03-25 DIAGNOSIS — O09.529 ADVANCED MATERNAL AGE IN MULTIGRAVIDA, UNSPECIFIED TRIMESTER: Primary | ICD-10-CM

## 2021-03-25 DIAGNOSIS — O09.529 ADVANCED MATERNAL AGE IN MULTIGRAVIDA, UNSPECIFIED TRIMESTER: ICD-10-CM

## 2021-03-25 DIAGNOSIS — D56.3 THALASSEMIA MINOR: ICD-10-CM

## 2021-03-25 DIAGNOSIS — O34.219 PREVIOUS CESAREAN DELIVERY AFFECTING PREGNANCY: ICD-10-CM

## 2021-03-25 PROCEDURE — 76819 FETAL BIOPHYS PROFIL W/O NST: CPT

## 2021-03-25 PROCEDURE — 76816 OB US FOLLOW-UP PER FETUS: CPT | Performed by: OBSTETRICS & GYNECOLOGY

## 2021-03-25 PROCEDURE — 76819 FETAL BIOPHYS PROFIL W/O NST: CPT | Performed by: OBSTETRICS & GYNECOLOGY

## 2021-03-25 PROCEDURE — 99213 OFFICE O/P EST LOW 20 MIN: CPT | Performed by: OBSTETRICS & GYNECOLOGY

## 2021-04-20 DIAGNOSIS — Z34.90 PREGNANCY: Primary | ICD-10-CM

## 2021-05-03 ENCOUNTER — LAB ENCOUNTER (OUTPATIENT)
Dept: LAB | Facility: HOSPITAL | Age: 36
End: 2021-05-03
Attending: OBSTETRICS & GYNECOLOGY
Payer: COMMERCIAL

## 2021-05-03 DIAGNOSIS — Z34.90 PREGNANCY: ICD-10-CM

## 2021-05-04 ENCOUNTER — TELEPHONE (OUTPATIENT)
Dept: OBGYN UNIT | Facility: HOSPITAL | Age: 36
End: 2021-05-04

## 2021-05-05 ENCOUNTER — TELEPHONE (OUTPATIENT)
Dept: OBGYN UNIT | Facility: HOSPITAL | Age: 36
End: 2021-05-05

## 2021-05-05 RX ORDER — MELATONIN
325
COMMUNITY
End: 2022-01-31 | Stop reason: ALTCHOICE

## 2021-05-05 NOTE — H&P
[address] and Delivery Prenatal History and Physical Interval Addendum  Please see full Prenatal Record for this pregnancy      SUBJECTIVE:    Interval History: This is a pregnancy at 39 weeks admitted for repeat  delivery.   [place]

## 2021-05-06 ENCOUNTER — HOSPITAL ENCOUNTER (INPATIENT)
Facility: HOSPITAL | Age: 36
LOS: 2 days | Discharge: HOME | End: 2021-05-08
Attending: OBSTETRICS & GYNECOLOGY | Admitting: OBSTETRICS & GYNECOLOGY
Payer: COMMERCIAL

## 2021-05-06 ENCOUNTER — ANESTHESIA EVENT (OUTPATIENT)
Dept: OBGYN UNIT | Facility: HOSPITAL | Age: 36
End: 2021-05-06
Payer: COMMERCIAL

## 2021-05-06 ENCOUNTER — ANESTHESIA (OUTPATIENT)
Dept: OBGYN UNIT | Facility: HOSPITAL | Age: 36
End: 2021-05-06
Payer: COMMERCIAL

## 2021-05-06 PROBLEM — Z34.90 PREGNANCY: Status: ACTIVE | Noted: 2021-05-06

## 2021-05-06 PROBLEM — Z34.90 PREGNANCY (HCC): Status: ACTIVE | Noted: 2021-05-06

## 2021-05-06 PROCEDURE — 86900 BLOOD TYPING SEROLOGIC ABO: CPT | Performed by: OBSTETRICS & GYNECOLOGY

## 2021-05-06 PROCEDURE — 86850 RBC ANTIBODY SCREEN: CPT | Performed by: OBSTETRICS & GYNECOLOGY

## 2021-05-06 PROCEDURE — 85025 COMPLETE CBC W/AUTO DIFF WBC: CPT | Performed by: OBSTETRICS & GYNECOLOGY

## 2021-05-06 PROCEDURE — 86780 TREPONEMA PALLIDUM: CPT | Performed by: OBSTETRICS & GYNECOLOGY

## 2021-05-06 PROCEDURE — 88305 TISSUE EXAM BY PATHOLOGIST: CPT | Performed by: OBSTETRICS & GYNECOLOGY

## 2021-05-06 PROCEDURE — 86901 BLOOD TYPING SEROLOGIC RH(D): CPT | Performed by: OBSTETRICS & GYNECOLOGY

## 2021-05-06 RX ORDER — PHENYLEPHRINE HCL 10 MG/ML
VIAL (ML) INJECTION AS NEEDED
Status: DISCONTINUED | OUTPATIENT
Start: 2021-05-06 | End: 2021-05-06 | Stop reason: SURG

## 2021-05-06 RX ORDER — KETOROLAC TROMETHAMINE 30 MG/ML
INJECTION, SOLUTION INTRAMUSCULAR; INTRAVENOUS
Status: COMPLETED
Start: 2021-05-06 | End: 2021-05-06

## 2021-05-06 RX ORDER — TRISODIUM CITRATE DIHYDRATE AND CITRIC ACID MONOHYDRATE 500; 334 MG/5ML; MG/5ML
30 SOLUTION ORAL ONCE
Status: COMPLETED | OUTPATIENT
Start: 2021-05-06 | End: 2021-05-06

## 2021-05-06 RX ORDER — BUPIVACAINE HYDROCHLORIDE 7.5 MG/ML
INJECTION, SOLUTION INTRASPINAL AS NEEDED
Status: DISCONTINUED | OUTPATIENT
Start: 2021-05-06 | End: 2021-05-06 | Stop reason: SURG

## 2021-05-06 RX ORDER — SODIUM CHLORIDE, SODIUM LACTATE, POTASSIUM CHLORIDE, CALCIUM CHLORIDE 600; 310; 30; 20 MG/100ML; MG/100ML; MG/100ML; MG/100ML
INJECTION, SOLUTION INTRAVENOUS CONTINUOUS
Status: DISCONTINUED | OUTPATIENT
Start: 2021-05-06 | End: 2021-05-08

## 2021-05-06 RX ORDER — KETOROLAC TROMETHAMINE 30 MG/ML
30 INJECTION, SOLUTION INTRAMUSCULAR; INTRAVENOUS EVERY 6 HOURS
Status: DISPENSED | OUTPATIENT
Start: 2021-05-06 | End: 2021-05-07

## 2021-05-06 RX ORDER — ONDANSETRON 2 MG/ML
INJECTION INTRAMUSCULAR; INTRAVENOUS AS NEEDED
Status: DISCONTINUED | OUTPATIENT
Start: 2021-05-06 | End: 2021-05-06 | Stop reason: SURG

## 2021-05-06 RX ORDER — ONDANSETRON 2 MG/ML
4 INJECTION INTRAMUSCULAR; INTRAVENOUS EVERY 6 HOURS PRN
Status: DISCONTINUED | OUTPATIENT
Start: 2021-05-06 | End: 2021-05-08

## 2021-05-06 RX ORDER — SODIUM CHLORIDE, SODIUM LACTATE, POTASSIUM CHLORIDE, CALCIUM CHLORIDE 600; 310; 30; 20 MG/100ML; MG/100ML; MG/100ML; MG/100ML
125 INJECTION, SOLUTION INTRAVENOUS CONTINUOUS
Status: DISCONTINUED | OUTPATIENT
Start: 2021-05-06 | End: 2021-05-06

## 2021-05-06 RX ORDER — DIPHENHYDRAMINE HYDROCHLORIDE 50 MG/ML
25 INJECTION INTRAMUSCULAR; INTRAVENOUS ONCE AS NEEDED
Status: DISCONTINUED | OUTPATIENT
Start: 2021-05-06 | End: 2021-05-06 | Stop reason: HOSPADM

## 2021-05-06 RX ORDER — SIMETHICONE 80 MG
80 TABLET,CHEWABLE ORAL 3 TIMES DAILY PRN
Status: DISCONTINUED | OUTPATIENT
Start: 2021-05-06 | End: 2021-05-08

## 2021-05-06 RX ORDER — DEXAMETHASONE SODIUM PHOSPHATE 4 MG/ML
VIAL (ML) INJECTION AS NEEDED
Status: DISCONTINUED | OUTPATIENT
Start: 2021-05-06 | End: 2021-05-06 | Stop reason: SURG

## 2021-05-06 RX ORDER — HYDROMORPHONE HYDROCHLORIDE 1 MG/ML
0.4 INJECTION, SOLUTION INTRAMUSCULAR; INTRAVENOUS; SUBCUTANEOUS EVERY 2 HOUR PRN
Status: ACTIVE | OUTPATIENT
Start: 2021-05-06 | End: 2021-05-07

## 2021-05-06 RX ORDER — DOCUSATE SODIUM 100 MG/1
100 CAPSULE, LIQUID FILLED ORAL
Status: DISCONTINUED | OUTPATIENT
Start: 2021-05-07 | End: 2021-05-08

## 2021-05-06 RX ORDER — KETOROLAC TROMETHAMINE 30 MG/ML
30 INJECTION, SOLUTION INTRAMUSCULAR; INTRAVENOUS ONCE AS NEEDED
Status: COMPLETED | OUTPATIENT
Start: 2021-05-06 | End: 2021-05-06

## 2021-05-06 RX ORDER — MORPHINE SULFATE 2 MG/ML
INJECTION, SOLUTION INTRAMUSCULAR; INTRAVENOUS AS NEEDED
Status: DISCONTINUED | OUTPATIENT
Start: 2021-05-06 | End: 2021-05-06 | Stop reason: SURG

## 2021-05-06 RX ORDER — GABAPENTIN 300 MG/1
300 CAPSULE ORAL EVERY 8 HOURS PRN
Status: DISCONTINUED | OUTPATIENT
Start: 2021-05-06 | End: 2021-05-08

## 2021-05-06 RX ORDER — HYDROMORPHONE HYDROCHLORIDE 1 MG/ML
0.4 INJECTION, SOLUTION INTRAMUSCULAR; INTRAVENOUS; SUBCUTANEOUS EVERY 5 MIN PRN
Status: DISCONTINUED | OUTPATIENT
Start: 2021-05-06 | End: 2021-05-06 | Stop reason: HOSPADM

## 2021-05-06 RX ORDER — DIPHENHYDRAMINE HYDROCHLORIDE 50 MG/ML
12.5 INJECTION INTRAMUSCULAR; INTRAVENOUS EVERY 4 HOURS PRN
Status: DISCONTINUED | OUTPATIENT
Start: 2021-05-06 | End: 2021-05-08

## 2021-05-06 RX ORDER — NALBUPHINE HCL 10 MG/ML
2.5 AMPUL (ML) INJECTION EVERY 4 HOURS PRN
Status: DISCONTINUED | OUTPATIENT
Start: 2021-05-06 | End: 2021-05-08

## 2021-05-06 RX ORDER — ACETAMINOPHEN 500 MG
1000 TABLET ORAL EVERY 6 HOURS
Status: DISCONTINUED | OUTPATIENT
Start: 2021-05-06 | End: 2021-05-08

## 2021-05-06 RX ORDER — NALOXONE HYDROCHLORIDE 0.4 MG/ML
0.08 INJECTION, SOLUTION INTRAMUSCULAR; INTRAVENOUS; SUBCUTANEOUS
Status: ACTIVE | OUTPATIENT
Start: 2021-05-06 | End: 2021-05-07

## 2021-05-06 RX ORDER — MORPHINE SULFATE 0.5 MG/ML
0.2 INJECTION, SOLUTION EPIDURAL; INTRATHECAL; INTRAVENOUS ONCE
Status: DISCONTINUED | OUTPATIENT
Start: 2021-05-06 | End: 2021-05-06

## 2021-05-06 RX ORDER — METOCLOPRAMIDE HYDROCHLORIDE 5 MG/ML
INJECTION INTRAMUSCULAR; INTRAVENOUS AS NEEDED
Status: DISCONTINUED | OUTPATIENT
Start: 2021-05-06 | End: 2021-05-06 | Stop reason: SURG

## 2021-05-06 RX ORDER — IBUPROFEN 600 MG/1
600 TABLET ORAL EVERY 6 HOURS
Status: DISCONTINUED | OUTPATIENT
Start: 2021-05-07 | End: 2021-05-07

## 2021-05-06 RX ORDER — DEXTROSE, SODIUM CHLORIDE, SODIUM LACTATE, POTASSIUM CHLORIDE, AND CALCIUM CHLORIDE 5; .6; .31; .03; .02 G/100ML; G/100ML; G/100ML; G/100ML; G/100ML
INJECTION, SOLUTION INTRAVENOUS CONTINUOUS PRN
Status: DISCONTINUED | OUTPATIENT
Start: 2021-05-06 | End: 2021-05-08

## 2021-05-06 RX ORDER — ONDANSETRON 2 MG/ML
4 INJECTION INTRAMUSCULAR; INTRAVENOUS ONCE AS NEEDED
Status: DISCONTINUED | OUTPATIENT
Start: 2021-05-06 | End: 2021-05-06 | Stop reason: HOSPADM

## 2021-05-06 RX ORDER — ONDANSETRON 2 MG/ML
4 INJECTION INTRAMUSCULAR; INTRAVENOUS EVERY 6 HOURS PRN
Status: DISCONTINUED | OUTPATIENT
Start: 2021-05-06 | End: 2021-05-06 | Stop reason: HOSPADM

## 2021-05-06 RX ORDER — ACETAMINOPHEN 500 MG
1000 TABLET ORAL ONCE
Status: COMPLETED | OUTPATIENT
Start: 2021-05-06 | End: 2021-05-06

## 2021-05-06 RX ORDER — BISACODYL 10 MG
10 SUPPOSITORY, RECTAL RECTAL
Status: DISCONTINUED | OUTPATIENT
Start: 2021-05-06 | End: 2021-05-08

## 2021-05-06 RX ORDER — DIPHENHYDRAMINE HCL 25 MG
25 CAPSULE ORAL EVERY 4 HOURS PRN
Status: DISCONTINUED | OUTPATIENT
Start: 2021-05-06 | End: 2021-05-08

## 2021-05-06 RX ORDER — CEFAZOLIN SODIUM/WATER 2 G/20 ML
2 SYRINGE (ML) INTRAVENOUS ONCE
Status: COMPLETED | OUTPATIENT
Start: 2021-05-06 | End: 2021-05-06

## 2021-05-06 RX ORDER — NALBUPHINE HCL 10 MG/ML
2.5 AMPUL (ML) INJECTION
Status: DISCONTINUED | OUTPATIENT
Start: 2021-05-06 | End: 2021-05-06

## 2021-05-06 RX ADMIN — CEFAZOLIN SODIUM/WATER 2 G: 2 G/20 ML SYRINGE (ML) INTRAVENOUS at 08:41:00

## 2021-05-06 RX ADMIN — SODIUM CHLORIDE, SODIUM LACTATE, POTASSIUM CHLORIDE, CALCIUM CHLORIDE: 600; 310; 30; 20 INJECTION, SOLUTION INTRAVENOUS at 09:32:00

## 2021-05-06 RX ADMIN — METOCLOPRAMIDE HYDROCHLORIDE 10 MG: 5 INJECTION INTRAMUSCULAR; INTRAVENOUS at 08:59:00

## 2021-05-06 RX ADMIN — PHENYLEPHRINE HCL 100 MCG: 10 MG/ML VIAL (ML) INJECTION at 08:40:00

## 2021-05-06 RX ADMIN — ONDANSETRON 4 MG: 2 INJECTION INTRAMUSCULAR; INTRAVENOUS at 08:59:00

## 2021-05-06 RX ADMIN — MORPHINE SULFATE 0.2 MG: 2 INJECTION, SOLUTION INTRAMUSCULAR; INTRAVENOUS at 08:38:00

## 2021-05-06 RX ADMIN — MORPHINE SULFATE 1.8 MG: 2 INJECTION, SOLUTION INTRAMUSCULAR; INTRAVENOUS at 08:59:00

## 2021-05-06 RX ADMIN — PHENYLEPHRINE HCL 100 MCG: 10 MG/ML VIAL (ML) INJECTION at 08:45:00

## 2021-05-06 RX ADMIN — DEXAMETHASONE SODIUM PHOSPHATE 4 MG: 4 MG/ML VIAL (ML) INJECTION at 08:59:00

## 2021-05-06 RX ADMIN — PHENYLEPHRINE HCL 100 MCG: 10 MG/ML VIAL (ML) INJECTION at 08:49:00

## 2021-05-06 RX ADMIN — BUPIVACAINE HYDROCHLORIDE 1.5 ML: 7.5 INJECTION, SOLUTION INTRASPINAL at 08:38:00

## 2021-05-06 NOTE — OPERATIVE REPORT
PREOPERATIVE DIAGNOSIS:   1. 39 week pregnancy  2. Prior  x 2    POSTOPERATIVE DIAGNOSIS:   same    PROCEDURE PERFORMED: repeat low transverse  section.      SURGEON: Gabrielle Curling, MD    ASSISTANT: MAIN [place]    ANESTHESIA: Spinal     FI and found to be hemostatic. 2-0 plain gut used to reapproximate the  subcutaneous fat. Skin was closed with 4-0 Vicryl   in a subcuticular fashion. Mastisol and Steri-Strips were applied. All sponge, needle, and instrument counts were correct.  Estimated

## 2021-05-06 NOTE — ANESTHESIA POSTPROCEDURE EVALUATION
[facility] Patient Status:  Inpatient   Age/Gender 39year old female MRN [id number]   Location [facility] Attending Medina Jose MD   Hosp Day # 0 PCP Darwin Martin MD       Anesthesia Post-op Note

## 2021-05-06 NOTE — PROGRESS NOTES
Pt is a 39year old female admitted to [facility]. Patient presents with:  Scheduled : Repeat     Pt is [id number] 39w0d intra-uterine pregnancy. History obtained, consents signed. Oriented to room, staff, and plan of care.

## 2021-05-06 NOTE — PROGRESS NOTES
Pt transferred per cart with baby in arms to room 3600. Report given to [facility].  Proper ID done with two RNs

## 2021-05-06 NOTE — ANESTHESIA PREPROCEDURE EVALUATION
PRE-OP EVALUATION    Patient Name: Derrick Chacon    Admit Diagnosis: preg state  Pregnancy    Pre-op Diagnosis: * No pre-op diagnosis entered *     SECTION    Anesthesia Procedure:  SECTION (N/A )    Surgeon(s) and Role:     Keli Suárez, cardiovascular ROS. Endo/Other    Negative endo/other ROS. Pulmonary    Negative pulmonary ROS. Neuro/Psych    Negative neuro/psych ROS.

## 2021-05-06 NOTE — ANESTHESIA PROCEDURE NOTES
Spinal Block  Performed by: Brian Javier MD  Authorized by: Brian Javier MD       General Information and Staff    Start Time:  5/6/2021 8:27 AM  End Time:  5/6/2021 8:38 AM  Anesthesiologist:  Brian Javier MD  Performed by:   Anesthesiologist  Patient L

## 2021-05-07 PROCEDURE — 85025 COMPLETE CBC W/AUTO DIFF WBC: CPT | Performed by: OBSTETRICS & GYNECOLOGY

## 2021-05-07 RX ORDER — IBUPROFEN 600 MG/1
600 TABLET ORAL EVERY 6 HOURS
Status: DISCONTINUED | OUTPATIENT
Start: 2021-05-07 | End: 2021-05-08

## 2021-05-07 NOTE — PLAN OF CARE
Problem: PAIN - ADULT  Goal: Verbalizes/displays adequate comfort level or patient's stated pain goal  Description: INTERVENTIONS:  - Encourage pt to monitor pain and request assistance  - Assess pain using appropriate pain scale  - Administer analgesics breast pumps). - Encourage mother/other family members to express feelings/concerns, and actively listen. - Educate father/SO about benefits of breast feeding and how to manage common lactation challenges.   - Recommend avoidance of specific medications o

## 2021-05-07 NOTE — PROGRESS NOTES
[facility] 2SW-J john Becker Patient Status:  Inpatient   Age/Gender 39year old female MRN [id number]   [facility] 2SW-J Attending Javy Harper MD   Hosp Day # 1 PCP Vielka Watters MD      Anesthesia Pain Pro

## 2021-05-07 NOTE — PROGRESS NOTES
Postop Day 1    Pt without complaints.      Temp:  [97.4 °F (36.3 °C)-97.8 °F (36.6 °C)] 97.4 °F (36.3 °C)  Pulse:  [55-58] 58  Resp:  [16-18] 18  BP: (103-131)/(64-88) 131/88    Intake/Output Summary (Last 24 hours) at 5/7/2021 1155  Last data filed at 5/7

## 2021-05-08 VITALS
HEART RATE: 74 BPM | HEIGHT: 64 IN | TEMPERATURE: 98 F | OXYGEN SATURATION: 98 % | SYSTOLIC BLOOD PRESSURE: 104 MMHG | BODY MASS INDEX: 28.51 KG/M2 | RESPIRATION RATE: 16 BRPM | WEIGHT: 167 LBS | DIASTOLIC BLOOD PRESSURE: 63 MMHG

## 2021-05-08 RX ORDER — GABAPENTIN 300 MG/1
300 CAPSULE ORAL EVERY 8 HOURS PRN
Qty: 10 CAPSULE | Refills: 0 | Status: SHIPPED | OUTPATIENT
Start: 2021-05-08 | End: 2021-05-12

## 2021-05-08 RX ORDER — NALOXONE HYDROCHLORIDE 4 MG/.1ML
4 SPRAY, METERED NASAL AS NEEDED
Qty: 1 KIT | Refills: 0 | Status: SHIPPED | OUTPATIENT
Start: 2021-05-08 | End: 2021-05-20 | Stop reason: ALTCHOICE

## 2021-05-08 RX ORDER — OXYCODONE HYDROCHLORIDE 5 MG/1
5 TABLET ORAL EVERY 4 HOURS PRN
Qty: 5 TABLET | Refills: 0 | Status: SHIPPED | OUTPATIENT
Start: 2021-05-08 | End: 2021-05-20 | Stop reason: ALTCHOICE

## 2021-05-08 NOTE — PROGRESS NOTES
Postop Day 2    Pt without complaints.      Temp:  [98 °F (36.7 °C)-98.5 °F (36.9 °C)] 98 °F (36.7 °C)  Pulse:  [64-74] 74  Resp:  [16-18] 16  BP: (104)/(63) 104/63  abd  soft, NT, ND, fundus firm below umbilicus, incision C/D/I  extr  trace edema, no calf

## 2021-05-08 NOTE — PROGRESS NOTES
Patient up and about, AOx4, seen by OB, ok to go home, DC instructions completed, postpartum care,  care and meds, and patient verbalized understanding, spouse at bedside very helpful

## 2021-05-08 NOTE — PLAN OF CARE
Problem: PAIN - ADULT  Goal: Verbalizes/displays adequate comfort level or patient's stated pain goal  Description: INTERVENTIONS:  - Encourage pt to monitor pain and request assistance  - Assess pain using appropriate pain scale  - Administer analgesics prophylaxis as needed. - Monitor bowel function.  - Encourage ambulation and provide assistance as needed. - Assess and monitor emotional status and provide social service/psych resources as needed.   - Utilize standard precautions and use personal protec Outcome: Progressing  Goal: Establishment of adequate milk supply with medication/procedure interruptions  Description: INTERVENTIONS:  - Review techniques for milk expression (breast pumping).    - Provide pumping equipment/supplies, instructions, and as

## 2021-05-08 NOTE — DISCHARGE SUMMARY
[facility] Discharge Summary    Date of admission:  2021  6:32 AM  Date of discharge:  2021  Admit diagnosis:  39w0d      Previous  section  Discharge diagnosis: Same     Status post  section  Hospital course:     John Seals

## 2021-05-11 ENCOUNTER — NURSE ONLY (OUTPATIENT)
Dept: LACTATION | Facility: HOSPITAL | Age: 36
End: 2021-05-11
Attending: OBSTETRICS & GYNECOLOGY
Payer: COMMERCIAL

## 2021-05-11 VITALS — TEMPERATURE: 98 F

## 2021-05-11 DIAGNOSIS — O92.79 ENGORGEMENT OF BREASTS, POSTPARTUM CONDITION OR COMPLICATION: ICD-10-CM

## 2021-05-11 DIAGNOSIS — O92.29 SORE NIPPLES DUE TO LACTATION: Primary | ICD-10-CM

## 2021-05-11 PROCEDURE — 99213 OFFICE O/P EST LOW 20 MIN: CPT

## 2021-05-11 NOTE — PROGRESS NOTES
LACTATION NOTE - MOTHER      Evaluation Type: Outpatient Initial    Problems identified  Problems identified: Knowledge deficit;Milk supply WNL; Nipple pain; Nipple trauma  Problems Identified Other: Baby with shallow latch and possible tongue-restriction on one breast per feeding.  At today's visit, he struggled with the initial latch but once attached, nursed efficiently and took in 124 ml from the right breast. We discussed treatments for sore nipples, some latch techniques and engorgement care and oral r

## 2021-05-12 ENCOUNTER — TELEPHONE (OUTPATIENT)
Dept: OBGYN UNIT | Facility: HOSPITAL | Age: 36
End: 2021-05-12

## 2021-05-12 NOTE — PROGRESS NOTES
RN completed cradle call follow up with patient by phone. RN reviewed self and infant care with mom who verbalized understanding of instructions reviewed. Patient encouraged to follow up with physicians as directed for additional questions or concerns.

## 2021-05-27 ENCOUNTER — NURSE ONLY (OUTPATIENT)
Dept: LACTATION | Facility: HOSPITAL | Age: 36
End: 2021-05-27
Attending: OBSTETRICS & GYNECOLOGY
Payer: COMMERCIAL

## 2021-05-27 DIAGNOSIS — O92.29 SORE NIPPLES DUE TO LACTATION: Primary | ICD-10-CM

## 2021-05-27 PROCEDURE — 99212 OFFICE O/P EST SF 10 MIN: CPT

## 2021-05-27 NOTE — PROGRESS NOTES
LACTATION NOTE - MOTHER      Evaluation Type: Outpatient Follow Up    Problems identified  Problems identified: Knowledge deficit;Milk supply WNL; Nipple pain  Problems Identified Other: Nipple pain despite infant tongue tie release and improved feedings expected. We discussed various causes and treatment for each. Information was given on vasospasm and recommendation to consult with Speech-Language therapy for ideas. Verbal understanding stated.

## 2021-06-15 PROBLEM — Z34.90 PREGNANCY: Status: RESOLVED | Noted: 2021-05-06 | Resolved: 2021-06-15

## 2021-06-15 PROBLEM — O09.529 ADVANCED MATERNAL AGE IN MULTIGRAVIDA, UNSPECIFIED TRIMESTER (HCC): Status: RESOLVED | Noted: 2020-10-26 | Resolved: 2021-06-15

## 2021-06-15 PROBLEM — O34.219 PREVIOUS CESAREAN DELIVERY AFFECTING PREGNANCY: Status: RESOLVED | Noted: 2020-10-26 | Resolved: 2021-06-15

## 2021-06-15 PROBLEM — Z34.81 PRENATAL CARE, SUBSEQUENT PREGNANCY, FIRST TRIMESTER: Status: RESOLVED | Noted: 2020-10-26 | Resolved: 2021-06-15

## 2021-06-15 PROBLEM — O09.529 ADVANCED MATERNAL AGE IN MULTIGRAVIDA, UNSPECIFIED TRIMESTER: Status: RESOLVED | Noted: 2020-10-26 | Resolved: 2021-06-15

## 2021-06-15 PROBLEM — Z34.81 PRENATAL CARE, SUBSEQUENT PREGNANCY, FIRST TRIMESTER (HCC): Status: RESOLVED | Noted: 2020-10-26 | Resolved: 2021-06-15

## 2021-06-15 PROBLEM — O34.219 PREVIOUS CESAREAN DELIVERY AFFECTING PREGNANCY (HCC): Status: RESOLVED | Noted: 2020-10-26 | Resolved: 2021-06-15

## 2021-06-15 PROBLEM — Z34.90 PREGNANCY (HCC): Status: RESOLVED | Noted: 2021-05-06 | Resolved: 2021-06-15

## 2021-10-20 ENCOUNTER — TELEPHONE (OUTPATIENT)
Dept: FAMILY MEDICINE CLINIC | Facility: CLINIC | Age: 36
End: 2021-10-20

## 2021-10-20 DIAGNOSIS — D64.9 LOW HEMOGLOBIN: Primary | ICD-10-CM

## 2021-10-20 NOTE — TELEPHONE ENCOUNTER
rec'd results from pt wellness labs from employer. Dr Obi Vidal would like ferritin ordered. Lab ordered. My chart sent.

## 2021-10-23 ENCOUNTER — LAB ENCOUNTER (OUTPATIENT)
Dept: LAB | Facility: HOSPITAL | Age: 36
End: 2021-10-23
Attending: FAMILY MEDICINE
Payer: COMMERCIAL

## 2021-10-23 DIAGNOSIS — D64.9 LOW HEMOGLOBIN: ICD-10-CM

## 2021-10-23 PROCEDURE — 36415 COLL VENOUS BLD VENIPUNCTURE: CPT

## 2021-10-23 PROCEDURE — 82728 ASSAY OF FERRITIN: CPT

## 2022-01-31 ENCOUNTER — OFFICE VISIT (OUTPATIENT)
Dept: FAMILY MEDICINE CLINIC | Facility: CLINIC | Age: 37
End: 2022-01-31
Payer: COMMERCIAL

## 2022-01-31 VITALS
SYSTOLIC BLOOD PRESSURE: 122 MMHG | RESPIRATION RATE: 16 BRPM | HEART RATE: 78 BPM | DIASTOLIC BLOOD PRESSURE: 70 MMHG | BODY MASS INDEX: 23.73 KG/M2 | HEIGHT: 64 IN | WEIGHT: 139 LBS | TEMPERATURE: 98 F | OXYGEN SATURATION: 99 %

## 2022-01-31 DIAGNOSIS — Z00.00 ANNUAL PHYSICAL EXAM: Primary | ICD-10-CM

## 2022-01-31 PROCEDURE — 3074F SYST BP LT 130 MM HG: CPT | Performed by: FAMILY MEDICINE

## 2022-01-31 PROCEDURE — 3008F BODY MASS INDEX DOCD: CPT | Performed by: FAMILY MEDICINE

## 2022-01-31 PROCEDURE — 3078F DIAST BP <80 MM HG: CPT | Performed by: FAMILY MEDICINE

## 2022-01-31 PROCEDURE — 99395 PREV VISIT EST AGE 18-39: CPT | Performed by: FAMILY MEDICINE

## 2022-01-31 NOTE — PROGRESS NOTES
HPI:   Deisy Gaffney is a 44year old female who presents for a complete physical exam.       Wt Readings from Last 6 Encounters:  01/31/22 : 139 lb (63 kg)  06/15/21 : 146 lb (66.2 kg)  05/20/21 : 151 lb 2 oz (68.5 kg)  05/06/21 : 167 lb (75.8 kg) history  ALL/ASTHMA: denies hx of allergy or asthma    EXAM:   /70   Pulse 78   Temp 98 °F (36.7 °C)   Resp 16   Ht 5' 4\" (1.626 m)   Wt 139 lb (63 kg)   LMP 01/21/2022   SpO2 99%   BMI 23.86 kg/m²   Body mass index is 23.86 kg/m².    GENERAL: alert

## 2022-03-14 ENCOUNTER — LAB ENCOUNTER (OUTPATIENT)
Dept: LAB | Facility: HOSPITAL | Age: 37
End: 2022-03-14
Attending: FAMILY MEDICINE
Payer: COMMERCIAL

## 2022-03-14 DIAGNOSIS — Z00.00 ANNUAL PHYSICAL EXAM: ICD-10-CM

## 2022-03-14 LAB — TSI SER-ACNC: 2.06 MIU/ML (ref 0.36–3.74)

## 2022-03-14 PROCEDURE — 84439 ASSAY OF FREE THYROXINE: CPT

## 2022-03-14 PROCEDURE — 84443 ASSAY THYROID STIM HORMONE: CPT

## 2022-03-14 PROCEDURE — 36415 COLL VENOUS BLD VENIPUNCTURE: CPT

## 2022-06-11 ENCOUNTER — OFFICE VISIT (OUTPATIENT)
Dept: FAMILY MEDICINE CLINIC | Facility: CLINIC | Age: 37
End: 2022-06-11
Payer: COMMERCIAL

## 2022-06-11 VITALS
WEIGHT: 140 LBS | TEMPERATURE: 99 F | HEIGHT: 63 IN | RESPIRATION RATE: 18 BRPM | DIASTOLIC BLOOD PRESSURE: 74 MMHG | BODY MASS INDEX: 24.8 KG/M2 | OXYGEN SATURATION: 99 % | HEART RATE: 83 BPM | SYSTOLIC BLOOD PRESSURE: 120 MMHG

## 2022-06-11 DIAGNOSIS — H66.001 ACUTE SUPPURATIVE OTITIS MEDIA OF RIGHT EAR WITHOUT SPONTANEOUS RUPTURE OF TYMPANIC MEMBRANE, RECURRENCE NOT SPECIFIED: ICD-10-CM

## 2022-06-11 DIAGNOSIS — H69.82 DYSFUNCTION OF LEFT EUSTACHIAN TUBE: Primary | ICD-10-CM

## 2022-06-11 PROCEDURE — 99213 OFFICE O/P EST LOW 20 MIN: CPT | Performed by: NURSE PRACTITIONER

## 2022-06-11 PROCEDURE — 3008F BODY MASS INDEX DOCD: CPT | Performed by: NURSE PRACTITIONER

## 2022-06-11 PROCEDURE — 3078F DIAST BP <80 MM HG: CPT | Performed by: NURSE PRACTITIONER

## 2022-06-11 PROCEDURE — 3074F SYST BP LT 130 MM HG: CPT | Performed by: NURSE PRACTITIONER

## 2022-06-11 RX ORDER — FLUTICASONE PROPIONATE 50 MCG
2 SPRAY, SUSPENSION (ML) NASAL DAILY
Qty: 1 EACH | Refills: 0 | Status: SHIPPED | OUTPATIENT
Start: 2022-06-11 | End: 2023-06-06

## 2022-06-11 RX ORDER — AMOXICILLIN 875 MG/1
875 TABLET, COATED ORAL 2 TIMES DAILY
Qty: 20 TABLET | Refills: 0 | Status: SHIPPED | OUTPATIENT
Start: 2022-06-11 | End: 2022-06-21

## 2022-06-24 ENCOUNTER — PATIENT MESSAGE (OUTPATIENT)
Dept: FAMILY MEDICINE CLINIC | Facility: CLINIC | Age: 37
End: 2022-06-24

## 2022-06-27 NOTE — TELEPHONE ENCOUNTER
From: Ronimarietta Arevalo  To: Christiano Reynolds DO  Sent: 6/24/2022 11:53 PM CDT  Subject: Ear Pain    Hello there. I was wondering about getting seen for continued ear pain? On June 11th I went to the walk in clinic on [address] [place] [address] for ear pain. I had been having a dull ache for a week off and on as well as noticed my hearing in the left we ear wasn't great. I was told I had fluid build up in the left and a slight infection in the right. I took 10 days of antibiotics, flonase and antihistamine. However my ear still aches. I looked to make an appt through the [facility] and it said August 23rd. How can I get seen sooner? Thank you!

## 2022-06-30 ENCOUNTER — OFFICE VISIT (OUTPATIENT)
Dept: FAMILY MEDICINE CLINIC | Facility: CLINIC | Age: 37
End: 2022-06-30
Payer: COMMERCIAL

## 2022-06-30 VITALS
RESPIRATION RATE: 20 BRPM | HEART RATE: 81 BPM | DIASTOLIC BLOOD PRESSURE: 76 MMHG | OXYGEN SATURATION: 99 % | HEIGHT: 63 IN | BODY MASS INDEX: 24.8 KG/M2 | SYSTOLIC BLOOD PRESSURE: 118 MMHG | WEIGHT: 140 LBS | TEMPERATURE: 98 F

## 2022-06-30 DIAGNOSIS — H69.82 EUSTACHIAN TUBE DYSFUNCTION, LEFT: Primary | ICD-10-CM

## 2022-06-30 PROCEDURE — 99213 OFFICE O/P EST LOW 20 MIN: CPT | Performed by: INTERNAL MEDICINE

## 2022-06-30 PROCEDURE — 3074F SYST BP LT 130 MM HG: CPT | Performed by: INTERNAL MEDICINE

## 2022-06-30 PROCEDURE — 3008F BODY MASS INDEX DOCD: CPT | Performed by: INTERNAL MEDICINE

## 2022-06-30 PROCEDURE — 3078F DIAST BP <80 MM HG: CPT | Performed by: INTERNAL MEDICINE

## 2022-06-30 RX ORDER — PREDNISONE 20 MG/1
TABLET ORAL
Qty: 10 TABLET | Refills: 0 | Status: SHIPPED | OUTPATIENT
Start: 2022-06-30

## 2023-02-02 ENCOUNTER — LAB ENCOUNTER (OUTPATIENT)
Dept: LAB | Facility: HOSPITAL | Age: 38
End: 2023-02-02
Attending: FAMILY MEDICINE
Payer: COMMERCIAL

## 2023-02-02 DIAGNOSIS — D64.9 LOW HEMOGLOBIN: ICD-10-CM

## 2023-02-02 LAB
BASOPHILS # BLD AUTO: 0.04 X10(3) UL (ref 0–0.2)
BASOPHILS NFR BLD AUTO: 0.6 %
DEPRECATED HBV CORE AB SER IA-ACNC: 42.9 NG/ML
EOSINOPHIL # BLD AUTO: 0.17 X10(3) UL (ref 0–0.7)
EOSINOPHIL NFR BLD AUTO: 2.5 %
ERYTHROCYTE [DISTWIDTH] IN BLOOD BY AUTOMATED COUNT: 15.6 %
HCT VFR BLD AUTO: 33.3 %
HGB BLD-MCNC: 10.3 G/DL
IMM GRANULOCYTES # BLD AUTO: 0.02 X10(3) UL (ref 0–1)
IMM GRANULOCYTES NFR BLD: 0.3 %
LYMPHOCYTES # BLD AUTO: 2.01 X10(3) UL (ref 1–4)
LYMPHOCYTES NFR BLD AUTO: 29.3 %
MCH RBC QN AUTO: 19.5 PG (ref 26–34)
MCHC RBC AUTO-ENTMCNC: 30.9 G/DL (ref 31–37)
MCV RBC AUTO: 62.9 FL
MONOCYTES # BLD AUTO: 0.48 X10(3) UL (ref 0.1–1)
MONOCYTES NFR BLD AUTO: 7 %
NEUTROPHILS # BLD AUTO: 4.15 X10 (3) UL (ref 1.5–7.7)
NEUTROPHILS # BLD AUTO: 4.15 X10(3) UL (ref 1.5–7.7)
NEUTROPHILS NFR BLD AUTO: 60.3 %
PLATELET # BLD AUTO: 249 10(3)UL (ref 150–450)
RBC # BLD AUTO: 5.29 X10(6)UL
WBC # BLD AUTO: 6.9 X10(3) UL (ref 4–11)

## 2023-02-02 PROCEDURE — 36415 COLL VENOUS BLD VENIPUNCTURE: CPT

## 2023-02-02 PROCEDURE — 82728 ASSAY OF FERRITIN: CPT

## 2023-02-02 PROCEDURE — 85025 COMPLETE CBC W/AUTO DIFF WBC: CPT

## 2023-03-30 ENCOUNTER — OFFICE VISIT (OUTPATIENT)
Dept: FAMILY MEDICINE CLINIC | Facility: CLINIC | Age: 38
End: 2023-03-30
Payer: COMMERCIAL

## 2023-03-30 VITALS
DIASTOLIC BLOOD PRESSURE: 80 MMHG | HEIGHT: 63 IN | SYSTOLIC BLOOD PRESSURE: 112 MMHG | WEIGHT: 143 LBS | TEMPERATURE: 98 F | BODY MASS INDEX: 25.34 KG/M2 | OXYGEN SATURATION: 99 % | RESPIRATION RATE: 18 BRPM | HEART RATE: 78 BPM

## 2023-03-30 DIAGNOSIS — J34.89 SINUS PAIN: ICD-10-CM

## 2023-03-30 DIAGNOSIS — Z00.00 ANNUAL PHYSICAL EXAM: Primary | ICD-10-CM

## 2023-03-30 PROCEDURE — 3079F DIAST BP 80-89 MM HG: CPT | Performed by: FAMILY MEDICINE

## 2023-03-30 PROCEDURE — 99395 PREV VISIT EST AGE 18-39: CPT | Performed by: FAMILY MEDICINE

## 2023-03-30 PROCEDURE — 3074F SYST BP LT 130 MM HG: CPT | Performed by: FAMILY MEDICINE

## 2023-03-30 PROCEDURE — 3008F BODY MASS INDEX DOCD: CPT | Performed by: FAMILY MEDICINE

## 2023-03-30 RX ORDER — FLUTICASONE PROPIONATE 50 MCG
2 SPRAY, SUSPENSION (ML) NASAL NIGHTLY
Qty: 3 EACH | Refills: 0 | Status: SHIPPED | OUTPATIENT
Start: 2023-03-30

## 2023-03-30 RX ORDER — AMOXICILLIN AND CLAVULANATE POTASSIUM 875; 125 MG/1; MG/1
1 TABLET, FILM COATED ORAL 2 TIMES DAILY
Qty: 20 TABLET | Refills: 0 | Status: SHIPPED | OUTPATIENT
Start: 2023-03-30 | End: 2023-04-09

## 2023-12-28 ENCOUNTER — OFFICE VISIT (OUTPATIENT)
Dept: PODIATRY CLINIC | Facility: CLINIC | Age: 38
End: 2023-12-28
Payer: COMMERCIAL

## 2023-12-28 VITALS — DIASTOLIC BLOOD PRESSURE: 72 MMHG | SYSTOLIC BLOOD PRESSURE: 110 MMHG

## 2023-12-28 DIAGNOSIS — M77.50 CAPSULITIS OF METATARSOPHALANGEAL (MTP) JOINT: Primary | ICD-10-CM

## 2023-12-28 PROCEDURE — 99203 OFFICE O/P NEW LOW 30 MIN: CPT | Performed by: PODIATRIST

## 2023-12-28 PROCEDURE — 3074F SYST BP LT 130 MM HG: CPT | Performed by: PODIATRIST

## 2023-12-28 PROCEDURE — 20600 DRAIN/INJ JOINT/BURSA W/O US: CPT | Performed by: PODIATRIST

## 2023-12-28 PROCEDURE — 3078F DIAST BP <80 MM HG: CPT | Performed by: PODIATRIST

## 2023-12-28 RX ORDER — TRIAMCINOLONE ACETONIDE 40 MG/ML
20 INJECTION, SUSPENSION INTRA-ARTICULAR; INTRAMUSCULAR ONCE
Status: DISCONTINUED | OUTPATIENT
Start: 2023-12-28 | End: 2024-01-06

## 2024-01-18 ENCOUNTER — OFFICE VISIT (OUTPATIENT)
Dept: PODIATRY CLINIC | Facility: CLINIC | Age: 39
End: 2024-01-18
Payer: COMMERCIAL

## 2024-01-18 DIAGNOSIS — S99.922D INJURY OF PLANTAR PLATE OF LEFT FOOT, SUBSEQUENT ENCOUNTER: ICD-10-CM

## 2024-01-18 DIAGNOSIS — M77.50 CAPSULITIS OF METATARSOPHALANGEAL (MTP) JOINT: Primary | ICD-10-CM

## 2024-01-18 PROCEDURE — 99213 OFFICE O/P EST LOW 20 MIN: CPT | Performed by: PODIATRIST

## 2024-01-23 NOTE — PROGRESS NOTES
Caroline Rizo is a 39 year old female.   Chief Complaint   Patient presents with    Follow - Up     Left foot 2nd toe- patient denies pain-          HPI:   Returns with left foot still has some discomfort no severe pain injection helped a little bit.  At today's visit reviewed nurse's history as taken above, allergies medications and medical history as documented below.  All changes duly noted  Allergies: Patient has no known allergies.   No current outpatient medications on file.      Past Medical History:   Diagnosis Date    Anemia     thalessemia minor    Hypothyroidism     not currently on medication    Infertility     Clomid X 1 cycle      Past Surgical History:   Procedure Laterality Date      2015      Family History   Problem Relation Age of Onset    Diabetes Maternal Grandmother       Social History     Socioeconomic History    Marital status:    Tobacco Use    Smoking status: Never    Smokeless tobacco: Never   Vaping Use    Vaping Use: Never used   Substance and Sexual Activity    Alcohol use: No    Drug use: No    Sexual activity: Not Currently     Partners: Male           REVIEW OF SYSTEMS:   Today reviewed systens as documented below  GENERAL HEALTH: feels well otherwise  SKIN: Refer to exam below  RESPIRATORY: denies shortness of breath with exertion  CARDIOVASCULAR: denies chest pain on exertion  GI: denies abdominal pain and denies heartburn  NEURO: denies headaches    EXAM:   LMP 2023 (Approximate)   GENERAL: well developed, well nourished, in no apparent distress  EXTREMITIES:   1. Integument: Skin on the left foot was evaluated its warm and dry no color changes from the cortisone injection   2. Vascular: Patient has palpable pulses   3. Neurologic: Has intact sensorium   4. Musculoskeletal: Still has pain on palpation of the second MTP joint medially plantarly little bit off to the lateral side this was delineated from the interspace which does not seem to be  sensitive.    ASSESSMENT AND PLAN:   Diagnoses and all orders for this visit:    Capsulitis of metatarsophalangeal (MTP) joint  -     US FOOT BILATERAL COMPLETE (CPT=76881); Future    Injury of plantar plate of left foot, subsequent encounter  -     US FOOT BILATERAL COMPLETE (CPT=76881); Future        Plan: At today's visit rather than give her another cortisone injection we will see what is going on we sent her for diagnostic ultrasound to Dr. John Marina should make an appointment to see me within a week after she gets that done.    The patient indicates understanding of these issues and agrees to the plan.    Regan Rodriguez DPM

## 2024-03-04 ENCOUNTER — OFFICE VISIT (OUTPATIENT)
Dept: PODIATRY CLINIC | Facility: CLINIC | Age: 39
End: 2024-03-04
Payer: COMMERCIAL

## 2024-03-04 DIAGNOSIS — M77.50 CAPSULITIS OF METATARSOPHALANGEAL (MTP) JOINT: Primary | ICD-10-CM

## 2024-03-04 DIAGNOSIS — Q66.89: ICD-10-CM

## 2024-03-04 DIAGNOSIS — M79.675 PAIN OF TOE OF LEFT FOOT: ICD-10-CM

## 2024-03-04 DIAGNOSIS — M77.42 METATARSALGIA OF LEFT FOOT: ICD-10-CM

## 2024-03-04 PROCEDURE — 99213 OFFICE O/P EST LOW 20 MIN: CPT | Performed by: PODIATRIST

## 2024-03-10 NOTE — PROGRESS NOTES
Caroline Rizo is a 39 year old female.   Chief Complaint   Patient presents with    Follow - Up     US results left foot- Denies pain.         HPI:   Patient returns to discuss ultrasound results on her left foot she is doing well.  Only minimal discomfort from time to time.  The cortisone injection did help.  At today's visit reviewed nurse's history as taken above, allergies medications and medical history as documented below.  All changes duly noted  Allergies: Patient has no known allergies.   No current outpatient medications on file.      Past Medical History:   Diagnosis Date    Anemia     thalessemia minor    Hypothyroidism     not currently on medication    Infertility     Clomid X 1 cycle      Past Surgical History:   Procedure Laterality Date      2015      Family History   Problem Relation Age of Onset    Diabetes Maternal Grandmother       Social History     Socioeconomic History    Marital status:    Tobacco Use    Smoking status: Never    Smokeless tobacco: Never   Vaping Use    Vaping Use: Never used   Substance and Sexual Activity    Alcohol use: No    Drug use: No    Sexual activity: Not Currently     Partners: Male           REVIEW OF SYSTEMS:   Today reviewed systens as documented below  GENERAL HEALTH: feels well otherwise  SKIN: Refer to exam below  RESPIRATORY: denies shortness of breath with exertion  CARDIOVASCULAR: denies chest pain on exertion  GI: denies abdominal pain and denies heartburn  NEURO: denies headaches    EXAM:   LMP 2023 (Approximate)   GENERAL: well developed, well nourished, in no apparent distress  EXTREMITIES:   1. Integument: Plantar left foot shows that it is intact there is no excessive erythema no edema   2. Vascular: Patient has palpable pulses   3. Neurologic: Patient has intact sensorium   4. Musculoskeletal: There is only mild discomfort on palpation with range of motion of the second MTP joint.  The ultrasound results show that there  is no discrete tear and this is a capsulitis of the second metatarsal phalangeal joint.    ASSESSMENT AND PLAN:   Diagnoses and all orders for this visit:    Capsulitis of metatarsophalangeal (MTP) joint    Boyer's toe of left foot    Pain of toe of left foot    Metatarsalgia of left foot        Plan: Today gave the patient diagnoses codes that she can call and see if orthotics are covered I think if she wears an orthotic that has a metatarsal pad and it it will help alleviate her symptoms by elevating the front part of the foot and offloading the metatarsals.  Return for orthotic casting if covered.    The patient indicates understanding of these issues and agrees to the plan.    Regan Rodriguez DPM

## 2024-03-25 ENCOUNTER — OFFICE VISIT (OUTPATIENT)
Dept: FAMILY MEDICINE CLINIC | Facility: CLINIC | Age: 39
End: 2024-03-25
Payer: COMMERCIAL

## 2024-03-25 VITALS
BODY MASS INDEX: 24.98 KG/M2 | HEART RATE: 82 BPM | TEMPERATURE: 98 F | DIASTOLIC BLOOD PRESSURE: 60 MMHG | SYSTOLIC BLOOD PRESSURE: 90 MMHG | WEIGHT: 141 LBS | HEIGHT: 63 IN | OXYGEN SATURATION: 98 %

## 2024-03-25 DIAGNOSIS — D64.9 ANEMIA, UNSPECIFIED TYPE: ICD-10-CM

## 2024-03-25 DIAGNOSIS — E07.9 THYROID MASS: ICD-10-CM

## 2024-03-25 DIAGNOSIS — R53.83 FATIGUE, UNSPECIFIED TYPE: ICD-10-CM

## 2024-03-25 DIAGNOSIS — Z00.00 WELL WOMAN EXAM WITHOUT GYNECOLOGICAL EXAM: Primary | ICD-10-CM

## 2024-03-25 DIAGNOSIS — E55.9 VITAMIN D DEFICIENCY: ICD-10-CM

## 2024-03-25 PROCEDURE — 99213 OFFICE O/P EST LOW 20 MIN: CPT | Performed by: FAMILY MEDICINE

## 2024-03-25 PROCEDURE — 3078F DIAST BP <80 MM HG: CPT | Performed by: FAMILY MEDICINE

## 2024-03-25 PROCEDURE — 3074F SYST BP LT 130 MM HG: CPT | Performed by: FAMILY MEDICINE

## 2024-03-25 PROCEDURE — 3008F BODY MASS INDEX DOCD: CPT | Performed by: FAMILY MEDICINE

## 2024-03-25 PROCEDURE — 99396 PREV VISIT EST AGE 40-64: CPT | Performed by: FAMILY MEDICINE

## 2024-03-25 NOTE — PROGRESS NOTES
SUBJECTIVE:  Chief Complaint   Patient presents with    Physical     New patient physical, history of being anemic, history of thyroid issues, more tired     HPI:  Feels like something has been off the last few months. Feels like her thyroid is enlarged. Has been more fatigued and non-restorative sleep. Feels that nails are more brittle. Had normal labs in the Fall.     Anemia: Usually iron deficient.   Hyperthyroidism: Dx as a teenager. Previously on meds (tapazole) but did not need anymore. Since that time has had normal thyroid readings.      Health Maintenance:  Vaccines: reviewed as below. Indicated today: none  Immunization History   Administered Date(s) Administered    Covid-19 Vaccine Moderna 100 mcg/0.5 ml 2021, 2021, 2021    Influenza 10/17/2020, 10/09/2021, 2022    TDAP 2010, 06/10/2015, 2017, 2021     Obesity screening: Body mass index is 24.98 kg/m².  Diabetes screening:  due  Hypercholesterolemia screening: due  Depression screen: Does note some stress and fatigue which has contributed.   Osteoporosis: No history of pathologic fractures. No steroid use, smoking, risk of falls, excessive alcohol use.  Cervical Cancer screening: Last Pap:    Colon Cancer screening: Family history of colon cancer? no   Breast Cancer screening: Family history of breast cancer? no Last mammogram: 2024  STI: Desires testing for STIs? no  Tobacco use:  reports that she has never smoked. She has never used smokeless tobacco.    ROS: Negative unless stated above    HISTORY:  Past Medical History:   Diagnosis Date    Anemia     thalessemia minor    Hypothyroidism     not currently on medication    Infertility     Clomid X 1 cycle      Past Surgical History:   Procedure Laterality Date      2015      Family History   Problem Relation Age of Onset    Anemia Mother     Diabetes Maternal Grandmother     Heart Disorder Maternal Grandmother     Diabetes Paternal Grandmother      Cancer Paternal Grandfather         Pancreas      Social History     Socioeconomic History    Marital status:     Number of children: 3   Tobacco Use    Smoking status: Never    Smokeless tobacco: Never   Vaping Use    Vaping Use: Never used   Substance and Sexual Activity    Alcohol use: Yes     Comment: A drink or two on occasion    Drug use: No    Sexual activity: Not Currently     Partners: Male   Other Topics Concern    Caffeine Concern No    Exercise No    Seat Belt No    Special Diet No    Stress Concern No    Weight Concern No        Allergies:  No Known Allergies    OBJECTIVE:  PHYSICAL EXAM:  Vitals:    03/25/24 1147   BP: 90/60   BP Location: Left arm   Patient Position: Sitting   Cuff Size: adult   Pulse: 82   Temp: 98.3 °F (36.8 °C)   TempSrc: Oral   SpO2: 98%   Weight: 141 lb (64 kg)   Height: 5' 3\" (1.6 m)     Physical Examination: General appearance - alert, well appearing, and in no distress and normal appearing weight  Mental status - alert, oriented to person, place, and time, normal mood, behavior, speech, dress, motor activity, and thought processes  Ears - bilateral TM's and external ear canals normal  Neck - supple, no significant adenopathy, enlarged left lobe of thyroid  Chest - clear to auscultation, no wheezes, rales or rhonchi, symmetric air entry  Heart - normal rate, regular rhythm, normal S1, S2, no murmurs, rubs, clicks or gallops  Abdomen - soft, nontender, nondistended, no masses or organomegaly  Extremities - peripheral pulses normal, no pedal edema, no clubbing or cyanosis    ASSESSMENT & PLAN:  Caroline Rizo is a 39 year old female is here for Physical (New patient physical, history of being anemic, history of thyroid issues, more tired)    Problem List Items Addressed This Visit    None  Visit Diagnoses       Well woman exam without gynecological exam    -  Primary    Relevant Orders    CBC With Differential With Platelet (Completed)    Comp Metabolic Panel  (14) (Completed)    Lipid Panel (Completed)    TSH W Reflex To Free T4 (Completed)    Vitamin D (Completed)    Ferritin (Completed)    Folic Acid Serum (Folate) (Completed)    Vitamin B12 (Completed)    Iron And Tibc (Completed)    US THYROID (CPT=76536)    Thyroid mass        Relevant Orders    TSH W Reflex To Free T4 (Completed)    US THYROID (CPT=76536)    Vitamin D deficiency        Relevant Orders    Vitamin D (Completed)    Fatigue, unspecified type        Anemia, unspecified type        Relevant Orders    Ferritin (Completed)    Folic Acid Serum (Folate) (Completed)    Vitamin B12 (Completed)    Iron And Tibc (Completed)            Caroline was seen today for physical.    Diagnoses and all orders for this visit:    Well woman exam without gynecological exam  Routine health maintenance reviewed, discussed and updated as below. This in labs cludes: . Healthy diet and exercise reviewed.  Given history of hyperthyroidism,  -     CBC With Differential With Platelet; Future  -     Comp Metabolic Panel (14); Future  -     Lipid Panel; Future  -     TSH W Reflex To Free T4; Future  -     Vitamin D; Future  -     Ferritin; Future  -     Folic Acid Serum (Folate); Future  -     Vitamin B12; Future  -     Iron And Tibc; Future  -     US THYROID (CPT=76536); Future    Thyroid mass  Will update TSH.  Also given isolated enlargement of thyroid gland, will obtain ultrasound.  -     TSH W Reflex To Free T4; Future  -     US THYROID (CPT=76536); Future    Vitamin D deficiency  -     Vitamin D; Future    Fatigue, unspecified type  Will rule out underlying cause as above    Anemia, unspecified type  -     Ferritin; Future  -     Folic Acid Serum (Folate); Future  -     Vitamin B12; Future  -     Iron And Tibc; Future    Call or return to clinic prn if these symptoms worsen or fail to improve as anticipated. RTC in 1 year.  Will notify patient of results.  Princess Hopkins DO  3/25/2024 12:18 PM    Note to Patient  The 21st Century  Cures Act makes medical notes like these available to patients in the interest of transparency. However, be advised this is a medical document and is intended as ceuc-jg-huhg communication; it is written in medical language and may appear blunt, direct, or contain abbreviations or verbiage that are unfamiliar. Medical documents are intended to carry relevant information, facts as evident, and the clinical opinion of the practitioner.     This report has been produced using speech recognition software, and may contain errors related to grammar, punctuation, spelling, words or phrases unrecognized or not translated appropriately to text; these errors may be referred to the dictating provider for further clarification and/or addendum as needed.

## 2024-03-27 ENCOUNTER — LAB ENCOUNTER (OUTPATIENT)
Dept: LAB | Age: 39
End: 2024-03-27
Attending: FAMILY MEDICINE
Payer: COMMERCIAL

## 2024-03-27 DIAGNOSIS — E55.9 VITAMIN D DEFICIENCY: ICD-10-CM

## 2024-03-27 DIAGNOSIS — D64.9 ANEMIA, UNSPECIFIED TYPE: ICD-10-CM

## 2024-03-27 DIAGNOSIS — E07.9 THYROID MASS: ICD-10-CM

## 2024-03-27 DIAGNOSIS — Z00.00 WELL WOMAN EXAM WITHOUT GYNECOLOGICAL EXAM: ICD-10-CM

## 2024-03-27 LAB
ALBUMIN SERPL-MCNC: 4.2 G/DL (ref 3.4–5)
ALBUMIN/GLOB SERPL: 1.1 {RATIO} (ref 1–2)
ALP LIVER SERPL-CCNC: 54 U/L
ALT SERPL-CCNC: 15 U/L
ANION GAP SERPL CALC-SCNC: 4 MMOL/L (ref 0–18)
AST SERPL-CCNC: 8 U/L (ref 15–37)
BASOPHILS # BLD AUTO: 0.04 X10(3) UL (ref 0–0.2)
BASOPHILS NFR BLD AUTO: 0.6 %
BILIRUB SERPL-MCNC: 0.8 MG/DL (ref 0.1–2)
BUN BLD-MCNC: 11 MG/DL (ref 9–23)
CALCIUM BLD-MCNC: 9.6 MG/DL (ref 8.5–10.1)
CHLORIDE SERPL-SCNC: 108 MMOL/L (ref 98–112)
CHOLEST SERPL-MCNC: 144 MG/DL (ref ?–200)
CO2 SERPL-SCNC: 26 MMOL/L (ref 21–32)
CREAT BLD-MCNC: 0.84 MG/DL
DEPRECATED HBV CORE AB SER IA-ACNC: 32.2 NG/ML
EGFRCR SERPLBLD CKD-EPI 2021: 90 ML/MIN/1.73M2 (ref 60–?)
EOSINOPHIL # BLD AUTO: 0.16 X10(3) UL (ref 0–0.7)
EOSINOPHIL NFR BLD AUTO: 2.6 %
ERYTHROCYTE [DISTWIDTH] IN BLOOD BY AUTOMATED COUNT: 16.7 %
FASTING PATIENT LIPID ANSWER: YES
FASTING STATUS PATIENT QL REPORTED: YES
FOLATE SERPL-MCNC: 17.7 NG/ML (ref 8.7–?)
GLOBULIN PLAS-MCNC: 3.7 G/DL (ref 2.8–4.4)
GLUCOSE BLD-MCNC: 104 MG/DL (ref 70–99)
HCT VFR BLD AUTO: 36.8 %
HDLC SERPL-MCNC: 44 MG/DL (ref 40–59)
HGB BLD-MCNC: 11.3 G/DL
IMM GRANULOCYTES # BLD AUTO: 0.01 X10(3) UL (ref 0–1)
IMM GRANULOCYTES NFR BLD: 0.2 %
IRON SATN MFR SERPL: 29 %
IRON SERPL-MCNC: 98 UG/DL
LDLC SERPL CALC-MCNC: 85 MG/DL (ref ?–100)
LYMPHOCYTES # BLD AUTO: 2.1 X10(3) UL (ref 1–4)
LYMPHOCYTES NFR BLD AUTO: 33.6 %
MCH RBC QN AUTO: 18.9 PG (ref 26–34)
MCHC RBC AUTO-ENTMCNC: 30.7 G/DL (ref 31–37)
MCV RBC AUTO: 61.6 FL
MONOCYTES # BLD AUTO: 0.49 X10(3) UL (ref 0.1–1)
MONOCYTES NFR BLD AUTO: 7.8 %
NEUTROPHILS # BLD AUTO: 3.45 X10 (3) UL (ref 1.5–7.7)
NEUTROPHILS # BLD AUTO: 3.45 X10(3) UL (ref 1.5–7.7)
NEUTROPHILS NFR BLD AUTO: 55.2 %
NONHDLC SERPL-MCNC: 100 MG/DL (ref ?–130)
OSMOLALITY SERPL CALC.SUM OF ELEC: 286 MOSM/KG (ref 275–295)
PLATELET # BLD AUTO: 243 10(3)UL (ref 150–450)
PLATELETS.RETICULATED NFR BLD AUTO: 7.2 % (ref 0–7)
POTASSIUM SERPL-SCNC: 4.1 MMOL/L (ref 3.5–5.1)
PROT SERPL-MCNC: 7.9 G/DL (ref 6.4–8.2)
RBC # BLD AUTO: 5.97 X10(6)UL
SODIUM SERPL-SCNC: 138 MMOL/L (ref 136–145)
T3FREE SERPL-MCNC: 2.83 PG/ML (ref 2.4–4.2)
T4 FREE SERPL-MCNC: 1 NG/DL (ref 0.8–1.7)
TIBC SERPL-MCNC: 337 UG/DL (ref 240–450)
TRANSFERRIN SERPL-MCNC: 226 MG/DL (ref 200–360)
TRIGL SERPL-MCNC: 79 MG/DL (ref 30–149)
TSI SER-ACNC: 0.03 MIU/ML (ref 0.36–3.74)
VIT B12 SERPL-MCNC: 790 PG/ML (ref 193–986)
VIT D+METAB SERPL-MCNC: 21.6 NG/ML (ref 30–100)
VLDLC SERPL CALC-MCNC: 13 MG/DL (ref 0–30)
WBC # BLD AUTO: 6.3 X10(3) UL (ref 4–11)

## 2024-03-27 PROCEDURE — 84439 ASSAY OF FREE THYROXINE: CPT

## 2024-03-27 PROCEDURE — 83540 ASSAY OF IRON: CPT

## 2024-03-27 PROCEDURE — 82607 VITAMIN B-12: CPT

## 2024-03-27 PROCEDURE — 80061 LIPID PANEL: CPT

## 2024-03-27 PROCEDURE — 82306 VITAMIN D 25 HYDROXY: CPT

## 2024-03-27 PROCEDURE — 36415 COLL VENOUS BLD VENIPUNCTURE: CPT

## 2024-03-27 PROCEDURE — 84481 FREE ASSAY (FT-3): CPT

## 2024-03-27 PROCEDURE — 84443 ASSAY THYROID STIM HORMONE: CPT

## 2024-03-27 PROCEDURE — 82746 ASSAY OF FOLIC ACID SERUM: CPT

## 2024-03-27 PROCEDURE — 80053 COMPREHEN METABOLIC PANEL: CPT

## 2024-03-27 PROCEDURE — 85025 COMPLETE CBC W/AUTO DIFF WBC: CPT

## 2024-03-27 PROCEDURE — 83550 IRON BINDING TEST: CPT

## 2024-03-27 PROCEDURE — 82728 ASSAY OF FERRITIN: CPT

## 2024-04-02 ENCOUNTER — HOSPITAL ENCOUNTER (OUTPATIENT)
Dept: ULTRASOUND IMAGING | Age: 39
Discharge: HOME | End: 2024-04-02
Attending: FAMILY MEDICINE
Payer: COMMERCIAL

## 2024-04-02 DIAGNOSIS — E55.9 VITAMIN D DEFICIENCY: Primary | ICD-10-CM

## 2024-04-02 DIAGNOSIS — E05.90 HYPERTHYROIDISM: ICD-10-CM

## 2024-04-02 DIAGNOSIS — Z00.00 WELL WOMAN EXAM WITHOUT GYNECOLOGICAL EXAM: ICD-10-CM

## 2024-04-02 DIAGNOSIS — E07.9 THYROID MASS: ICD-10-CM

## 2024-04-02 PROCEDURE — 76536 US EXAM OF HEAD AND NECK: CPT | Performed by: FAMILY MEDICINE

## 2024-04-02 RX ORDER — ERGOCALCIFEROL 1.25 MG/1
50000 CAPSULE ORAL WEEKLY
Qty: 4 CAPSULE | Refills: 0 | Status: SHIPPED | OUTPATIENT
Start: 2024-04-02 | End: 2024-05-02

## 2024-04-23 ENCOUNTER — LAB ENCOUNTER (OUTPATIENT)
Dept: LAB | Age: 39
End: 2024-04-23
Attending: FAMILY MEDICINE
Payer: COMMERCIAL

## 2024-04-23 DIAGNOSIS — E05.90 SUBCLINICAL HYPERTHYROIDISM: ICD-10-CM

## 2024-04-23 LAB
T3 SERPL-MCNC: 101 NG/DL (ref 60–181)
T4 FREE SERPL-MCNC: 1 NG/DL (ref 0.8–1.7)
TSI SER-ACNC: <0.005 MIU/ML (ref 0.36–3.74)

## 2024-04-23 PROCEDURE — 36415 COLL VENOUS BLD VENIPUNCTURE: CPT

## 2024-04-23 PROCEDURE — 84445 ASSAY OF TSI GLOBULIN: CPT

## 2024-04-23 PROCEDURE — 84480 ASSAY TRIIODOTHYRONINE (T3): CPT

## 2024-04-23 PROCEDURE — 84439 ASSAY OF FREE THYROXINE: CPT

## 2024-04-23 PROCEDURE — 84443 ASSAY THYROID STIM HORMONE: CPT

## 2024-04-25 ENCOUNTER — TELEPHONE (OUTPATIENT)
Dept: FAMILY MEDICINE CLINIC | Facility: CLINIC | Age: 39
End: 2024-04-25

## 2024-04-25 ENCOUNTER — OFFICE VISIT (OUTPATIENT)
Facility: CLINIC | Age: 39
End: 2024-04-25
Payer: COMMERCIAL

## 2024-04-25 VITALS — HEART RATE: 108 BPM | SYSTOLIC BLOOD PRESSURE: 92 MMHG | DIASTOLIC BLOOD PRESSURE: 60 MMHG | OXYGEN SATURATION: 98 %

## 2024-04-25 DIAGNOSIS — R73.03 PREDIABETES: ICD-10-CM

## 2024-04-25 DIAGNOSIS — E05.00 GRAVES DISEASE: ICD-10-CM

## 2024-04-25 DIAGNOSIS — E55.9 VITAMIN D DEFICIENCY: ICD-10-CM

## 2024-04-25 DIAGNOSIS — E05.90 SUBCLINICAL HYPERTHYROIDISM: Primary | ICD-10-CM

## 2024-04-25 DIAGNOSIS — E55.9 VITAMIN D INSUFFICIENCY: ICD-10-CM

## 2024-04-25 DIAGNOSIS — R76.8 ANTI-TPO ANTIBODIES PRESENT: ICD-10-CM

## 2024-04-25 LAB — THY STIM IMMUNO: 0.98 IU/L

## 2024-04-25 PROCEDURE — 3074F SYST BP LT 130 MM HG: CPT

## 2024-04-25 PROCEDURE — 3078F DIAST BP <80 MM HG: CPT

## 2024-04-25 PROCEDURE — 99215 OFFICE O/P EST HI 40 MIN: CPT

## 2024-04-25 RX ORDER — ERGOCALCIFEROL 1.25 MG/1
50000 CAPSULE ORAL WEEKLY
Qty: 4 CAPSULE | Refills: 1 | Status: SHIPPED | OUTPATIENT
Start: 2024-04-25 | End: 2024-05-25

## 2024-04-25 NOTE — TELEPHONE ENCOUNTER
Pt is calling for her Vitamin D to be refilled  ergocalciferol 1.25 MG (42317 UT) Oral Cap to [facility] on [address] [facility]. She is out of it.

## 2024-04-25 NOTE — TELEPHONE ENCOUNTER
A few things on labs:  - TSH is again low.  This is consistent with hyperthyroidism as you have had in the past.  I would like to get you into see the endocrinologist to review this and discuss next steps.  I will put a referral in which should appear in your MyChart.  I also attached the nurses on this to make sure you have all the information you are able to coordinate an appointment.  -Vit D is low. I recommend starting replacement with prescription dosing 50,000 IU WEEKLY for 12 weeks. Then to start 4000 IU daily. Take with food.  -Iron levels are normal as well as vitamin B12 and folic acid.  Anemia actually is improved from previous.  Consistent with thalassemia as we did previously discussed     Please let me know if you have any questions.  Princess Hopkins,  4/2/2024 10:45 PM

## 2024-04-25 NOTE — TELEPHONE ENCOUNTER
Pt is calling she said that Dr. Hopkins told her to take a multivitamin is she to take it with her high dose Vitamin D. She is also curious if she can take Magnesium.

## 2024-04-25 NOTE — PROGRESS NOTES
Endocrinology Clinic Note    Name: Caroline Rizo    Date: 4/25/2024    HISTORY OF PRESENT ILLNESS   Caroline Rizo is a 39 year old female with PMHx significant for hyperthyroidism (2000s), anemia who presents for consultation for subclinical hyperTH evaluation.    Subjective:    Initial HPI consult in April 2024  Here for consultation on thyroid.   Chief Complaint   Patient presents with    Consult     Patient establishing care with endocrinology. Received some lab work results that she would like to go over. States had previous thyroid problems in high school and were \"dormant\" since then.     Presented to wellness check at PCP/to establish care. Noted last few months, more fatigue and goiter. Notes her nails are more flimsy, thinking it was thyroid-related. Did have a hx of hypothyroidism while in high school; took tapazole 5mg daily for a long time. 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     Thyroid hx:  (+) Fhx of thyroid disease,   Fam hx of thyroid cancer: (-), History of neck/head radiation: (-)  Menses: regular, monthly (28-31 days); feels a bit heavier since last child was born three years ago, but they also stop sooner    4/2024: TSH <0.005, fT4 1.0, TT3 101, TSI 0.98  Jan 2018 antiTPO 1354, antiTg 160, and in Jan 2018 TSI was negative,  Vit D was low    Lifestyle: Mom of three,   Thyroid meds: no current meds    Pt endorses: fatigue, tightness in throat, and nails, loss of eyelashes.     Pt denies: temperature intolerance, tremor, palpitations, vision changes, hyperdefectation, dysphagia, unexplained weight loss, and changes in menses recent illness, weight loss medication use, recent IV contrast for imaging, amiodarone use, and biotin use. Denies dysphagia. Not taking any women's multivitamin.     History/Other:    REVIEW OF SYSTEMS  Ten point review of systems has been performed and is otherwise negative and/or  non-contributory, except as described above.    Medications:     Current Outpatient Medications:     ergocalciferol 1.25 MG (78235 UT) Oral Cap, Take 1 capsule (50,000 Units total) by mouth once a week., Disp: 4 capsule, Rfl: 0     Allergies:   No Known Allergies    Social History:   Social History     Socioeconomic History    Marital status:     Number of children: 3   Tobacco Use    Smoking status: Never    Smokeless tobacco: Never   Vaping Use    Vaping status: Never Used   Substance and Sexual Activity    Alcohol use: Yes     Comment: A drink or two on occasion    Drug use: No    Sexual activity: Not Currently     Partners: Male   Other Topics Concern    Caffeine Concern No    Exercise No    Seat Belt No    Special Diet No    Stress Concern No    Weight Concern No       Medical History:   Past Medical History:    Anemia    thalessemia minor    Hyperthyroidism    Hypothyroidism    not currently on medication    Infertility    Clomid X 1 cycle         Surgical history:   Past Surgical History:   Procedure Laterality Date      09/01/2015    x3       Objective:    PHYSICAL EXAMINATION:  BP 92/60   Pulse 108   SpO2 98%     CONSTITUTIONAL:  awake, alert, cooperative, no apparent distress, and appears stated age  PSYCH: normal affect  EYES:  No proptosis,  conjunctiva normal  ENT:  Normocephalic, atraumatic  NECK:  Supple, symmetrical, diffusely enlarged thyroid L>R side  MSK: No tremor noted  LUNGS: breathing comfortably  CARDIOVASCULAR:  regular rate   ABDOMEN:  soft  SKIN:  no rashes and no lesions  EXTREMITIES: no edema    Labs:   Latest Reference Range & Units 01/22/15 15:52 17 15:45 17 10:18 04/10/17 13:26 18 10:31 22 07:52 24 09:18 24 15:09   T4,Free (Direct) 0.8 - 1.7 ng/dL 1.24  0.84 (L) 0.95 0.95 1.0 1.0 1.0   TSH 0.358 - 3.740 mIU/mL 0.436 (L) 0.595 0.322 (L) 0.297 (L) 0.871 2.060 0.033 (L) <0.005 (L)   Thyroxine (T4) 4.5 - 10.9 ug/dL    8.3       T3  TOTAL 60 - 181 ng/dL        101   T3 FREE 2.40 - 4.20 pg/mL     2.68  2.83    ANTI-THYROGLOBULIN <60 U/mL    <15 160 (H)      ANTI-THYROPEROXIDASE <60 U/mL    125 (H) 1,354 (H)      Thy Stim Immuno 0.00 - 0.55 IU/L        0.98 (H)   (L): Data is abnormally low  (H): Data is abnormally high        Recent Labs     03/14/22  0752 03/27/24  0918 04/23/24  1509   T4F 1.0 1.0 1.0   TSH 2.060 0.033* <0.005*        Imaging:  US THYROID (CPT=76536)    Result Date: 4/3/2024  CONCLUSION:  1. Diffuse heterogeneity of the thyroid parenchyma within enlargement, consistent with goiter.  Associated hypervascularity of thyroid gland.  Please correlate clinically for thyroiditis/hyperthyroidism. 2. No focal thyroid nodules or masses are noted.   ACR TI-RADS recommendations: TR5 - FNA if greater than or equal to 1 cm, follow-up if 0.5-0.9 cm every year for 5 years. TR4 - FNA if greater than or equal to 1.5 cm, follow-up if 1-1.4 cm in 1, 2, 3 and 5 years. TR3 - FNA if greater than or equal to 2.5 cm, follow-up if 1.5-2.4 cm in 1, 3 and 5 years TR1 and TR2 - no FNA or follow-up  Please note ACR TI-RADS recommendations are based on imaging features and size of the nodule only.  In certain clinical circumstances additional or alternative evaluation may be indicated.   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 No hx of hypothyroidism  in pregnancy, TSH was minimally suppressed throughout but otherwise biochemically euthyroid for the last twenty years or so. Ultrasound non nodular, diffuse goiter present.  Patient clinically feels well.     - plan to repeat labs in 2 weeks to confirm graves vs subacute thyroiditis; more likely graves  - anticipate starting MMI, dose to be determined on repeat testing  - add on antiTPO/Tg again   - defer uptake & scan for now  - if graves, anticipate 12-18 months of MMI use with goal of TSI improvement/remission      (R73.03) Prediabetes  Plan: Hemoglobin A1C [E]  FBG notably 104 on 3/2024 CMP. No hx gestational DM. Strong fam hx of DM on dad's side  - add on A1C  - no current DM meds    (E55.9) Vitamin D insufficiency  Plan: Taking vit D [id number] weekly per PCP. Goal 30-60  - repeat labs in 12 wks from testing  - continue Vit D [id number] weekly    A total of 60 minutes was spent today on obtaining history, reviewing pertinent labs, evaluating patient, providing multiple treatment options, reinforcing diet/exercise and compliance, and completing documentation.      Return in about 2 months (around 6/25/2024).    4/25/2024  CIARA Ross

## 2024-04-25 NOTE — PATIENT INSTRUCTIONS
- may need to start methimazole 2.5mg daily; await repeat labs  - repeat labs in ~2 weeks (around 5/4) (fasting since midnight)  - complete A1C

## 2024-05-04 ENCOUNTER — LAB ENCOUNTER (OUTPATIENT)
Dept: LAB | Age: 39
End: 2024-05-04
Payer: COMMERCIAL

## 2024-05-04 DIAGNOSIS — E05.90 SUBCLINICAL HYPERTHYROIDISM: ICD-10-CM

## 2024-05-04 DIAGNOSIS — R73.03 PREDIABETES: ICD-10-CM

## 2024-05-04 DIAGNOSIS — R76.8 ANTI-TPO ANTIBODIES PRESENT: ICD-10-CM

## 2024-05-04 LAB
EST. AVERAGE GLUCOSE BLD GHB EST-MCNC: 111 MG/DL (ref 68–126)
HBA1C MFR BLD: 5.5 % (ref ?–5.7)
T3 SERPL-MCNC: 1.29 NG/ML (ref 0.6–1.81)
T4 FREE SERPL-MCNC: 1.1 NG/DL (ref 0.8–1.7)
THYROGLOB SERPL-MCNC: 240 U/ML (ref ?–60)
THYROPEROXIDASE AB SERPL-ACNC: 8193 U/ML (ref ?–60)
TSI SER-ACNC: 0.01 MIU/ML (ref 0.55–4.78)

## 2024-05-04 PROCEDURE — 86376 MICROSOMAL ANTIBODY EACH: CPT

## 2024-05-04 PROCEDURE — 84443 ASSAY THYROID STIM HORMONE: CPT

## 2024-05-04 PROCEDURE — 84439 ASSAY OF FREE THYROXINE: CPT

## 2024-05-04 PROCEDURE — 83036 HEMOGLOBIN GLYCOSYLATED A1C: CPT

## 2024-05-04 PROCEDURE — 84480 ASSAY TRIIODOTHYRONINE (T3): CPT

## 2024-05-04 PROCEDURE — 86800 THYROGLOBULIN ANTIBODY: CPT

## 2024-05-06 DIAGNOSIS — E05.90 SUBCLINICAL HYPERTHYROIDISM: Primary | ICD-10-CM

## 2024-06-13 ENCOUNTER — LAB ENCOUNTER (OUTPATIENT)
Dept: LAB | Age: 39
End: 2024-06-13
Payer: COMMERCIAL

## 2024-06-13 DIAGNOSIS — E05.90 SUBCLINICAL HYPERTHYROIDISM: ICD-10-CM

## 2024-06-13 LAB
T3 SERPL-MCNC: 72 NG/DL (ref 60–181)
T4 FREE SERPL-MCNC: 0.8 NG/DL (ref 0.8–1.7)
TSI SER-ACNC: 0.07 MIU/ML (ref 0.36–3.74)

## 2024-06-13 PROCEDURE — 84439 ASSAY OF FREE THYROXINE: CPT

## 2024-06-13 PROCEDURE — 84480 ASSAY TRIIODOTHYRONINE (T3): CPT

## 2024-06-13 PROCEDURE — 84443 ASSAY THYROID STIM HORMONE: CPT

## 2024-06-25 ENCOUNTER — OFFICE VISIT (OUTPATIENT)
Facility: CLINIC | Age: 39
End: 2024-06-25

## 2024-06-25 VITALS — HEART RATE: 82 BPM | DIASTOLIC BLOOD PRESSURE: 62 MMHG | OXYGEN SATURATION: 97 % | SYSTOLIC BLOOD PRESSURE: 106 MMHG

## 2024-06-25 DIAGNOSIS — R76.8 ANTI-TPO ANTIBODIES PRESENT: ICD-10-CM

## 2024-06-25 DIAGNOSIS — E55.9 VITAMIN D DEFICIENCY: ICD-10-CM

## 2024-06-25 DIAGNOSIS — E05.00 GRAVES DISEASE: ICD-10-CM

## 2024-06-25 DIAGNOSIS — E05.90 SUBCLINICAL HYPERTHYROIDISM: Primary | ICD-10-CM

## 2024-06-25 PROCEDURE — 99214 OFFICE O/P EST MOD 30 MIN: CPT

## 2024-06-25 PROCEDURE — 3074F SYST BP LT 130 MM HG: CPT

## 2024-06-25 PROCEDURE — 3078F DIAST BP <80 MM HG: CPT

## 2024-06-25 NOTE — PATIENT INSTRUCTIONS
Return in about 3 months (around 9/25/2024) for thyroid follow up.    - no meds today  - complete labs again after 7/20

## 2024-06-25 NOTE — PROGRESS NOTES
Endocrinology Clinic Note    Name: Caroline Rizo    Date: 6/25/2024    HISTORY OF PRESENT ILLNESS   Caroline Rizo is a 39 year old female with PMHx significant for hyperthyroidism (2000s), anemia who presents for consultation for subclinical hyperTH evaluation.    Subjective:    Initial HPI consult in April 2024  Here for consultation on thyroid.   Chief Complaint   Patient presents with    Consult     Patient establishing care with endocrinology. Received some lab work results that she would like to go over. States had previous thyroid problems in high school and were \"dormant\" since then.     Presented to wellness check at PCP/to establish care. Noted last few months, more fatigue and goiter. Notes her nails are more flimsy, thinking it was thyroid-related. Did have a hx of hypothyroidism while in high school; took tapazole 5mg daily for a long time. 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     Thyroid hx:  (+) Fhx of thyroid disease,   Fam hx of thyroid cancer: (-), History of neck/head radiation: (-)  Menses: regular, monthly (28-31 days); feels a bit heavier since last child was born three years ago, but they also stop sooner    4/2024: TSH <0.005, fT4 1.0, TT3 101, TSI 0.98  Jan 2018 antiTPO 1354, antiTg 160, and in Jan 2018 TSI was negative,  Vit D was low    Lifestyle: Mom of three,   Thyroid meds: no current meds    Pt endorses: fatigue, tightness in throat, and nails, loss of eyelashes.     Pt denies: temperature intolerance, tremor, palpitations, vision changes, hyperdefectation, dysphagia, unexplained weight loss, and changes in menses recent illness, weight loss medication use, recent IV contrast for imaging, amiodarone use, and biotin use. Denies dysphagia. Not taking any women's multivitamin.     Interim hx:  June 2024- w/ endo CIARA Singh re: thyroid:  Feeling well overall. Founds notes from previous peds endo- dx'd  with Graves in  (age ~14); was treated with Tapazole initially then was simultaneously placed on Synthroid- this went on for several years of alternating medications, then she stopped treatment in  (worked with homeopathic  and eventually TFTs normalized).   2024 TSH 0.069, free T4 0.8- no thyroid medication      History/Other:    REVIEW OF SYSTEMS  Ten point review of systems has been performed and is otherwise negative and/or non-contributory, except as described above.    Medications:   No current outpatient medications on file.     Allergies:   No Known Allergies    Social History:   Social History     Socioeconomic History    Marital status:     Number of children: 3   Tobacco Use    Smoking status: Never    Smokeless tobacco: Never   Vaping Use    Vaping status: Never Used   Substance and Sexual Activity    Alcohol use: Yes     Comment: A drink or two on occasion    Drug use: No    Sexual activity: Not Currently     Partners: Male   Other Topics Concern    Caffeine Concern No    Exercise No    Seat Belt No    Special Diet No    Stress Concern No    Weight Concern No       Medical History:   Past Medical History:    Anemia    thalessemia minor    Hyperthyroidism    Hypothyroidism    not currently on medication    Infertility    Clomid X 1 cycle         Surgical history:   Past Surgical History:   Procedure Laterality Date      09/01/2015    x3       Objective:    PHYSICAL EXAMINATION:  /62 (BP Location: Left arm, Patient Position: Sitting, Cuff Size: adult)   Pulse 82   SpO2 97%   Breastfeeding No     CONSTITUTIONAL:  awake, alert, cooperative, no apparent distress, and appears stated age  PSYCH: normal affect  EYES:  No proptosis,  conjunctiva normal  ENT:  Normocephalic, atraumatic  NECK:  Supple, symmetrical, diffusely enlarged thyroid L>R side  MSK: No tremor noted  LUNGS: breathing comfortably  CARDIOVASCULAR:  regular rate   ABDOMEN:  soft  SKIN:  no rashes and no  lesions  EXTREMITIES: no edema    Labs:   Latest Reference Range & Units 01/22/15 15:52 01/24/17 15:45 03/27/17 10:18 04/10/17 13:26 01/05/18 10:31 03/14/22 07:52 03/27/24 09:18 04/23/24 15:09   T4,Free (Direct) 0.8 - 1.7 ng/dL 1.24  0.84 (L) 0.95 0.95 1.0 1.0 1.0   TSH 0.358 - 3.740 mIU/mL 0.436 (L) 0.595 0.322 (L) 0.297 (L) 0.871 2.060 0.033 (L) <0.005 (L)   Thyroxine (T4) 4.5 - 10.9 ug/dL    8.3       T3 TOTAL 60 - 181 ng/dL        101   T3 FREE 2.40 - 4.20 pg/mL     2.68  2.83    ANTI-THYROGLOBULIN <60 U/mL    <15 160 (H)      ANTI-THYROPEROXIDASE <60 U/mL    125 (H) 1,354 (H)      Thy Stim Immuno 0.00 - 0.55 IU/L        0.98 (H)   (L): Data is abnormally low  (H): Data is abnormally high    Repeat antibody testing completed after last office visit:   Latest Reference Range & Units 05/04/24 07:56   ANTI-THYROGLOBULIN <60 U/mL 240 (H)   ANTI-THYROPEROXIDASE <60 U/mL 8,193 (H)   (H): Data is abnormally high        Recent Labs     04/23/24  1509 05/04/24  0756 06/13/24  0903   T4F 1.0 1.1 0.8   TSH <0.005* 0.013* 0.069*        Imaging:  US THYROID (CPT=76536)    Result Date: 4/3/2024  CONCLUSION:  1. Diffuse heterogeneity of the thyroid parenchyma within enlargement, consistent with goiter.  Associated hypervascularity of thyroid gland.  Please correlate clinically for thyroiditis/hyperthyroidism. 2. No focal thyroid nodules or masses are noted.   ACR TI-RADS recommendations: TR5 - FNA if greater than or equal to 1 cm, follow-up if 0.5-0.9 cm every year for 5 years. TR4 - FNA if greater than or equal to 1.5 cm, follow-up if 1-1.4 cm in 1, 2, 3 and 5 years. TR3 - FNA if greater than or equal to 2.5 cm, follow-up if 1.5-2.4 cm in 1, 3 and 5 years TR1 and TR2 - no FNA or follow-up  Please note ACR TI-RADS recommendations are based on imaging features and size of the nodule only.  In certain clinical circumstances additional or alternative evaluation may be indicated.   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 Eventually went into remission without medication and has been stable for the last 20 years or so. No hx of hypothyroidism in pregnancy, TSH was minimally suppressed throughout but otherwise biochemically euthyroid.    Presented with subclinical hyperthyroidism (TSH 0.033 in March 2024 --> entirely suppressed 1mo later, free T4 normal); (+)TSI (2024) and (+)AntiTPO/Tg (2024).  Ultrasound non nodular, diffuse goiter present.  Patient clinically feels well. Labs are continuing to trend upward. Discussed today this is likely thyroiditis given +antiTPO/Tg-- will give things another 6 weeks to determine if trending in right direction.     - TFTs q6 weeks until normalized  - will hold off on medication until diagnosis is clear; patient prefers conservative approach as well    (R73.03) Prediabetes  Plan: Hemoglobin A1C [E]  FBG notably 104 on 3/2024 CMP. No hx gestational DM. On last labs added on A1c value which was 5.5% done 5/4/2024.  - A1C normal  - continue annual follow up with PCP    (E55.9) Vitamin D insufficiency  Plan: Taking vit D 57318DS weekly per PCP. Goal 30-60  - repeat labs in 12 wks from testing-- repeat with next set of labs, ordered  - continue vit D 1000IU daily (she is going to add on daily MVI)     Return in about 3 months (around 9/25/2024) for thyroid follow up. Will arrange sooner follow up for phone discussion if labs are abnormal requiring medication start.    6/25/2024  CIARA Ross

## 2024-07-26 ENCOUNTER — LAB ENCOUNTER (OUTPATIENT)
Dept: LAB | Age: 39
End: 2024-07-26
Payer: COMMERCIAL

## 2024-07-26 DIAGNOSIS — E55.9 VITAMIN D DEFICIENCY: ICD-10-CM

## 2024-07-26 DIAGNOSIS — E05.90 SUBCLINICAL HYPERTHYROIDISM: ICD-10-CM

## 2024-07-26 LAB
T4 FREE SERPL-MCNC: 0.8 NG/DL (ref 0.8–1.7)
TSI SER-ACNC: 0.84 MIU/ML (ref 0.55–4.78)
VIT D+METAB SERPL-MCNC: 57.9 NG/ML (ref 30–100)

## 2024-07-26 PROCEDURE — 84443 ASSAY THYROID STIM HORMONE: CPT

## 2024-07-26 PROCEDURE — 84439 ASSAY OF FREE THYROXINE: CPT

## 2024-07-26 PROCEDURE — 82306 VITAMIN D 25 HYDROXY: CPT

## 2024-07-29 DIAGNOSIS — E05.90 SUBCLINICAL HYPERTHYROIDISM: Primary | ICD-10-CM

## 2024-09-19 ENCOUNTER — LAB ENCOUNTER (OUTPATIENT)
Dept: LAB | Age: 39
End: 2024-09-19
Attending: FAMILY MEDICINE
Payer: COMMERCIAL

## 2024-09-19 DIAGNOSIS — E05.90 SUBCLINICAL HYPERTHYROIDISM: ICD-10-CM

## 2024-09-19 LAB
T4 FREE SERPL-MCNC: 0.9 NG/DL (ref 0.8–1.7)
TSI SER-ACNC: 1.02 MIU/ML (ref 0.55–4.78)

## 2024-09-19 PROCEDURE — 84443 ASSAY THYROID STIM HORMONE: CPT

## 2024-09-19 PROCEDURE — 84439 ASSAY OF FREE THYROXINE: CPT

## 2024-09-24 ENCOUNTER — OFFICE VISIT (OUTPATIENT)
Facility: CLINIC | Age: 39
End: 2024-09-24
Payer: COMMERCIAL

## 2024-09-24 VITALS
HEART RATE: 91 BPM | DIASTOLIC BLOOD PRESSURE: 72 MMHG | WEIGHT: 140.63 LBS | HEIGHT: 63 IN | SYSTOLIC BLOOD PRESSURE: 106 MMHG | OXYGEN SATURATION: 98 % | BODY MASS INDEX: 24.92 KG/M2

## 2024-09-24 DIAGNOSIS — R76.8 ANTI-TPO ANTIBODIES PRESENT: Primary | ICD-10-CM

## 2024-09-24 DIAGNOSIS — Z86.39 HISTORY OF GRAVES' DISEASE: ICD-10-CM

## 2024-09-24 PROCEDURE — 3008F BODY MASS INDEX DOCD: CPT

## 2024-09-24 PROCEDURE — 3078F DIAST BP <80 MM HG: CPT

## 2024-09-24 PROCEDURE — 3074F SYST BP LT 130 MM HG: CPT

## 2024-09-24 PROCEDURE — 99213 OFFICE O/P EST LOW 20 MIN: CPT

## 2024-09-24 NOTE — PATIENT INSTRUCTIONS
Follow up with CIARA Ross: Return in about 6 months (around 3/24/2025).    - repeat labs in 6 months-- sooner if you have symptoms  (after 3/10/2025)  - telogen effluvium handout given      Understanding thyroid labs:  - TSH = thyroid stimulating hormone, this is the 'signal' which turns UP or DOWN depending on how much hormone your thyroid gland is producing.    - free T4, total T3 = the thyroid hormones in your blood, this is the thyroid number you can \"Feel\"- if T4 is low, that indicates hypothyroidism, if T4 is high, that indicates hyperthyroidism    [facility] - CIARA Ross  Office phone number: [phone]  Fax number: [phone]

## 2024-09-24 NOTE — PROGRESS NOTES
Endocrinology Clinic Note    Name: Caroline Rizo    Date: 9/24/2024    HISTORY OF PRESENT ILLNESS   Caroline Rizo is a 39 year old female with PMHx significant for hyperthyroidism (2000s), anemia who presents for consultation for subclinical hyperTH evaluation.    Subjective:    Initial HPI consult in April 2024  Here for consultation on thyroid.     Presented to wellness check at PCP/to establish care. Noted last few months, more fatigue and goiter. Notes her nails are more flimsy, thinking it was thyroid-related. Did have a hx of hypothyroidism while in high school; took tapazole 5mg daily for a long time. 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     Thyroid hx:  (+) Fhx of thyroid disease,   Fam hx of thyroid cancer: (-), History of neck/head radiation: (-)  Menses: regular, monthly (28-31 days); feels a bit heavier since last child was born three years ago, but they also stop sooner    4/2024: TSH <0.005, fT4 1.0, TT3 101, TSI 0.98  Jan 2018 antiTPO 1354, antiTg 160, and in Jan 2018 TSI was negative,  Vit D was low    Lifestyle: Mom of three,   Thyroid meds: no current meds    Pt endorses: fatigue, tightness in throat, and nails, loss of eyelashes.     Pt denies: temperature intolerance, tremor, palpitations, vision changes, hyperdefectation, dysphagia, unexplained weight loss, and changes in menses recent illness, weight loss medication use, recent IV contrast for imaging, amiodarone use, and biotin use. Denies dysphagia. Not taking any women's multivitamin.     Interim hx:  June 2024- w/ endo CIARA Singh, re: thyroid:  Feeling well overall. Founds notes from previous peds endo- dx'd with Graves in 1999 (age ~14); was treated with Tapazole initially then was simultaneously placed on Synthroid- this went on for several years of alternating medications, then she stopped treatment in 2006 (worked with homeopathic  and eventually  TFTs normalized).   2024 TSH 0.069, free T4 0.8- no thyroid medication    2024- w/ endo CIARA Singh, re: thyroid. Feeling well, good energy. Started taking magnesium supplement at night to help with sleep. Noting more hair loss (in clumps) - wondering if related to thyroid  Thyroid meds: Not currently on thyroid medication.  2024 labs: TSH 1.016, fT4 0.90     History/Other:    REVIEW OF SYSTEMS  Ten point review of systems has been performed and is otherwise negative and/or non-contributory, except as described above.    Medications:   No current outpatient medications on file.     Allergies:   No Known Allergies    Social History:   Social History     Socioeconomic History    Marital status:     Number of children: 3   Tobacco Use    Smoking status: Never    Smokeless tobacco: Never   Vaping Use    Vaping status: Never Used   Substance and Sexual Activity    Alcohol use: Yes     Comment: A drink or two on occasion    Drug use: No    Sexual activity: Not Currently     Partners: Male   Other Topics Concern    Caffeine Concern No    Exercise No    Seat Belt No    Special Diet No    Stress Concern No    Weight Concern No       Medical History:   Past Medical History:    Anemia    thalessemia minor    Hyperthyroidism    Hypothyroidism    not currently on medication    Infertility    Clomid X 1 cycle         Surgical history:   Past Surgical History:   Procedure Laterality Date      09/01/2015    x3       Objective:    PHYSICAL EXAMINATION:  /72 (BP Location: Left arm, Patient Position: Sitting, Cuff Size: adult)   Pulse 91   Ht 5' 3\" (1.6 m)   Wt 140 lb 9.6 oz (63.8 kg)   SpO2 98%   BMI 24.91 kg/m²     CONSTITUTIONAL:  awake, alert, cooperative, no apparent distress, and appears stated age  PSYCH: normal affect  EYES:  No proptosis,  conjunctiva normal  ENT:  Normocephalic, atraumatic  MSK: No tremor noted  LUNGS: breathing comfortably    Labs:   Latest Reference Range & Units 01/22/15  15:52 01/24/17 15:45 03/27/17 10:18 04/10/17 13:26 01/05/18 10:31 03/14/22 07:52 03/27/24 09:18 04/23/24 15:09   T4,Free (Direct) 0.8 - 1.7 ng/dL 1.24  0.84 (L) 0.95 0.95 1.0 1.0 1.0   TSH 0.358 - 3.740 mIU/mL 0.436 (L) 0.595 0.322 (L) 0.297 (L) 0.871 2.060 0.033 (L) <0.005 (L)   Thyroxine (T4) 4.5 - 10.9 ug/dL    8.3       T3 TOTAL 60 - 181 ng/dL        101   T3 FREE 2.40 - 4.20 pg/mL     2.68  2.83    ANTI-THYROGLOBULIN <60 U/mL    <15 160 (H)      ANTI-THYROPEROXIDASE <60 U/mL    125 (H) 1,354 (H)      Thy Stim Immuno 0.00 - 0.55 IU/L        0.98 (H)   (L): Data is abnormally low  (H): Data is abnormally high    Repeat antibody testing completed after last office visit:   Latest Reference Range & Units 05/04/24 07:56   ANTI-THYROGLOBULIN <60 U/mL 240 (H)   ANTI-THYROPEROXIDASE <60 U/mL 8,193 (H)   (H): Data is abnormally high        Recent Labs     06/13/24  0903 07/26/24  0948 09/19/24  1613   T4F 0.8 0.8 0.9   TSH 0.069* 0.837 1.016        Imaging:  US THYROID (CPT=76536)    Result Date: 4/3/2024  CONCLUSION:  1. Diffuse heterogeneity of the thyroid parenchyma within enlargement, consistent with goiter.  Associated hypervascularity of thyroid gland.  Please correlate clinically for thyroiditis/hyperthyroidism. 2. No focal thyroid nodules or masses are noted.   ACR TI-RADS recommendations: TR5 - FNA if greater than or equal to 1 cm, follow-up if 0.5-0.9 cm every year for 5 years. TR4 - FNA if greater than or equal to 1.5 cm, follow-up if 1-1.4 cm in 1, 2, 3 and 5 years. TR3 - FNA if greater than or equal to 2.5 cm, follow-up if 1.5-2.4 cm in 1, 3 and 5 years TR1 and TR2 - no FNA or follow-up  Please note ACR TI-RADS recommendations are based on imaging features and size of the nodule only.  In certain clinical circumstances additional or alternative evaluation may be indicated.   LOCATION:  [facility]        Dictated by (CST): Jim Rivera DO on 4/03/2024 at 10:33 AM     Finalized by (CST): Jim Rivera DO on  4/03/2024 at 10:34 AM         Assessment & Plan:    #Subclinical hyperthyroidism  (primary encounter diagnosis)  # Anti-TPO antibodies present    #History of Graves  Plan:  This is a pleasant 39 year old female with PMHx Graves disease ages 15-20s, during that time was started on tapazole with a peds endo then simultaneously put on Synthroid? Eventually went into remission without medication and has been stable for the last 20 years or so. No hx of hypothyroidism in pregnancy, TSH was minimally suppressed throughout but otherwise biochemically euthyroid.    Presented with subclinical hyperthyroidism (TSH 0.033 in March 2024 --> entirely suppressed 1mo later, free T4 normal); (+)TSI (2024) and (+)AntiTPO/Tg (2024).  Ultrasound non nodular, diffuse goiter present.  Patient clinically feels well and TFTs have normalized without medication. Likely mild thyroiditis. Will continue to trend given +antiTPO/Tg.  Hair loss likely telogen effluvium could be related to thyroid abnormality; should recover w/ time    - TFTs q6mo, sooner if sx's  - patient prefers conservative approach--     #Prediabetes  Plan:  FBG notably 104 on 3/2024 CMP. No hx gestational DM. On last labs added on A1c value which was 5.5% done 5/4/2024.  - A1C normal  - continue annual follow up with PCP    #Vitamin D insufficiency  Plan: Taking vit D [id number] weekly per PCP. Goal 30-60  - continue vit D 1000IU daily (she is going to add on daily MVI)     Return in about 6 months (around 3/24/2025).    9/24/2024  CIARA Ross

## 2024-10-03 ENCOUNTER — TELEPHONE (OUTPATIENT)
Facility: CLINIC | Age: 39
End: 2024-10-03

## 2024-10-03 NOTE — TELEPHONE ENCOUNTER
10/3/24-Received via fax from [facility] lab results collected on 10/2/24.  Given to APN to review.  Placed in APN in basket.

## 2025-03-20 ENCOUNTER — LAB ENCOUNTER (OUTPATIENT)
Dept: LAB | Facility: HOSPITAL | Age: 40
End: 2025-03-20
Payer: COMMERCIAL

## 2025-03-20 DIAGNOSIS — R76.8 ANTI-TPO ANTIBODIES PRESENT: ICD-10-CM

## 2025-03-20 LAB
T4 FREE SERPL-MCNC: 1 NG/DL (ref 0.8–1.7)
TSI SER-ACNC: 0.84 UIU/ML (ref 0.55–4.78)

## 2025-03-20 PROCEDURE — 84439 ASSAY OF FREE THYROXINE: CPT

## 2025-03-20 PROCEDURE — 36415 COLL VENOUS BLD VENIPUNCTURE: CPT

## 2025-03-20 PROCEDURE — 84443 ASSAY THYROID STIM HORMONE: CPT

## 2025-03-24 ENCOUNTER — TELEPHONE (OUTPATIENT)
Dept: FAMILY MEDICINE CLINIC | Facility: CLINIC | Age: 40
End: 2025-03-24

## 2025-03-24 ENCOUNTER — OFFICE VISIT (OUTPATIENT)
Facility: CLINIC | Age: 40
End: 2025-03-24
Payer: COMMERCIAL

## 2025-03-24 VITALS
OXYGEN SATURATION: 99 % | SYSTOLIC BLOOD PRESSURE: 100 MMHG | HEIGHT: 63 IN | WEIGHT: 140.19 LBS | HEART RATE: 66 BPM | DIASTOLIC BLOOD PRESSURE: 62 MMHG | BODY MASS INDEX: 24.84 KG/M2

## 2025-03-24 DIAGNOSIS — Z00.00 ROUTINE HEALTH MAINTENANCE: Primary | ICD-10-CM

## 2025-03-24 DIAGNOSIS — E04.9 ENLARGED THYROID: ICD-10-CM

## 2025-03-24 DIAGNOSIS — E55.9 VITAMIN D DEFICIENCY: ICD-10-CM

## 2025-03-24 DIAGNOSIS — Z86.39 HISTORY OF GRAVES' DISEASE: ICD-10-CM

## 2025-03-24 DIAGNOSIS — R76.8 ANTI-TPO ANTIBODIES PRESENT: Primary | ICD-10-CM

## 2025-03-24 DIAGNOSIS — R73.03 PREDIABETES: ICD-10-CM

## 2025-03-24 LAB — HEMOGLOBIN A1C: 5.2 % (ref 4.3–5.6)

## 2025-03-24 NOTE — PROGRESS NOTES
Endocrinology Clinic Note    Name: Caroline Rizo    Date: 3/24/2025    HISTORY OF PRESENT ILLNESS   Caroline Rizo is a 40 year old female with PMHx significant for hyperthyroidism (2000s), anemia who presents for consultation for subclinical hyperTH evaluation.    Subjective:    Initial HPI consult in April 2024  Here for consultation on thyroid.     Presented to wellness check at PCP/to establish care. Noted last few months, more fatigue and goiter. Notes her nails are more flimsy, thinking it was thyroid-related. Did have a hx of hypothyroidism while in high school; took tapazole 5mg daily for a long time. 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     Thyroid hx:  (+) Fhx of thyroid disease,   Fam hx of thyroid cancer: (-), History of neck/head radiation: (-)  Menses: regular, monthly (28-31 days); feels a bit heavier since last child was born three years ago, but they also stop sooner    4/2024: TSH <0.005, fT4 1.0, TT3 101, TSI 0.98  Jan 2018 antiTPO 1354, antiTg 160, and in Jan 2018 TSI was negative,  Vit D was low    Lifestyle: Mom of three,   Thyroid meds: no current meds    Pt endorses: fatigue, tightness in throat, and nails, loss of eyelashes.     Pt denies: temperature intolerance, tremor, palpitations, vision changes, hyperdefectation, dysphagia, unexplained weight loss, and changes in menses recent illness, weight loss medication use, recent IV contrast for imaging, amiodarone use, and biotin use. Denies dysphagia. Not taking any women's multivitamin.     Interim hx:  3/24/2025- w/ endo CIARA Singh, re: thyroid. Feeling really well  Started weight watchers, exercising more   Sleeping well, not exhausted like she was previously   Hair loss has improved; took through January to finally stop falling out   Thryoid Meds: none    Thyroid labs:  Recent Labs     07/26/24  0948 09/19/24  1613 03/20/25  1531   T4F 0.8 0.9 1.0    TSH 0.837 1.016 0.836          History/Other:    REVIEW OF SYSTEMS  Ten point review of systems has been performed and is otherwise negative and/or non-contributory, except as described above.    Medications:   No current outpatient medications on file.     Allergies:   No Known Allergies    Social History:   Social History     Socioeconomic History    Marital status:     Number of children: 3   Tobacco Use    Smoking status: Never    Smokeless tobacco: Never   Vaping Use    Vaping status: Never Used   Substance and Sexual Activity    Alcohol use: Yes     Comment: A drink or two on occasion    Drug use: No    Sexual activity: Not Currently     Partners: Male   Other Topics Concern    Caffeine Concern No    Exercise No    Seat Belt No    Special Diet No    Stress Concern No    Weight Concern No       Medical History:   Past Medical History:    Anemia    thalessemia minor    Atypical nevus    Hyperthyroidism    Hypothyroidism    not currently on medication    Infertility    Clomid X 1 cycle         Surgical history:   Past Surgical History:   Procedure Laterality Date      09/01/2015    x3       Objective:    PHYSICAL EXAMINATION:  /62 (BP Location: Left arm, Patient Position: Sitting, Cuff Size: adult)   Pulse 66   Ht 5' 3\" (1.6 m)   Wt 140 lb 3.2 oz (63.6 kg)   SpO2 99%   BMI 24.84 kg/m²     CONSTITUTIONAL:  awake, alert, cooperative, no apparent distress, and appears stated age  PSYCH: normal affect  EYES:  No proptosis,  conjunctiva normal  ENT:  Normocephalic, atraumatic  MSK: No tremor noted  LUNGS: breathing comfortably  NECK: L sided gland diffusely larger than R, non tender    Labs:   Latest Reference Range & Units 01/22/15 15:52 17 15:45 17 10:18 04/10/17 13:26 18 10:31 22 07:52 24 09:18 24 15:09   T4,Free (Direct) 0.8 - 1.7 ng/dL 1.24  0.84 (L) 0.95 0.95 1.0 1.0 1.0   TSH 0.358 - 3.740 mIU/mL 0.436 (L) 0.595 0.322 (L) 0.297 (L) 0.871 2.060  0.033 (L) <0.005 (L)   Thyroxine (T4) 4.5 - 10.9 ug/dL    8.3       T3 TOTAL 60 - 181 ng/dL        101   T3 FREE 2.40 - 4.20 pg/mL     2.68  2.83    ANTI-THYROGLOBULIN <60 U/mL    <15 160 (H)      ANTI-THYROPEROXIDASE <60 U/mL    125 (H) 1,354 (H)      Thy Stim Immuno 0.00 - 0.55 IU/L        0.98 (H)   (L): Data is abnormally low  (H): Data is abnormally high    Repeat antibody testing completed after last office visit:   Latest Reference Range & Units 05/04/24 07:56   ANTI-THYROGLOBULIN <60 U/mL 240 (H)   ANTI-THYROPEROXIDASE <60 U/mL 8,193 (H)   (H): Data is abnormally high        Recent Labs     07/26/24  0948 09/19/24  1613 03/20/25  1531   T4F 0.8 0.9 1.0   TSH 0.837 1.016 0.836        Imaging:  US THYROID (CPT=76536)    Result Date: 4/3/2024  CONCLUSION:  1. Diffuse heterogeneity of the thyroid parenchyma within enlargement, consistent with goiter.  Associated hypervascularity of thyroid gland.  Please correlate clinically for thyroiditis/hyperthyroidism. 2. No focal thyroid nodules or masses are noted.   ACR TI-RADS recommendations: TR5 - FNA if greater than or equal to 1 cm, follow-up if 0.5-0.9 cm every year for 5 years. TR4 - FNA if greater than or equal to 1.5 cm, follow-up if 1-1.4 cm in 1, 2, 3 and 5 years. TR3 - FNA if greater than or equal to 2.5 cm, follow-up if 1.5-2.4 cm in 1, 3 and 5 years TR1 and TR2 - no FNA or follow-up  Please note ACR TI-RADS recommendations are based on imaging features and size of the nodule only.  In certain clinical circumstances additional or alternative evaluation may be indicated.   LOCATION:  [facility]        Dictated by (CST): Jim Rivera DO on 4/03/2024 at 10:33 AM     Finalized by (CST): Jim Rivera DO on 4/03/2024 at 10:34 AM         Assessment & Plan:      ICD-10-CM    1. Anti-TPO antibodies present  R76.8 TSH and Free T4      2. History of Graves' disease  Z86.39       3. Vitamin D deficiency  E55.9       4. Enlarged thyroid  E04.9 US THYROID (CPT=76536)       5. Prediabetes  R73.03 POC Hemoglobin A1C          #Subclinical hyperthyroidism  (primary encounter diagnosis)  # Anti-TPO antibodies present    #History of Graves  Plan:  This is a pleasant 40 year old female with PMHx Graves disease ages 15-20s, during that time was started on tapazole with a peds endo then simultaneously put on Synthroid? Eventually went into remission without medication and has been stable for the last 20 years or so. No hx of hypothyroidism in pregnancy, TSH was minimally suppressed throughout but otherwise biochemically euthyroid.    Presented with subclinical hyperthyroidism (TSH 0.033 in March 2024 --> entirely suppressed 1mo later, free T4 normal); (+)TSI (2024) and (+)AntiTPO/Tg (2024).  Ultrasound non nodular, diffuse goiter present.  Patient clinically feels well and TFTs have normalized without medication.   Remains stable off of meds- will continue q6mo follow up and annual visits.     Thyroid labs:  Recent Labs     07/26/24  0948 09/19/24  1613 03/20/25  1531   T4F 0.8 0.9 1.0   TSH 0.837 1.016 0.836         - TFTs q6mo, sooner if sx's'    #Enlarged thyroid  L sided gland diffusely victoria than R side. Patient notices it in pictures. Non tender, no difficulty swallowing  - repeat thyroid ultrasound, prev 4/2024 was stable, nonnodular    #Prediabetes  Plan:  FBG notably 104 on 3/2024 CMP. No hx gestational DM. Today's A1C 5.2%  - A1C normal  - continue annual follow up with PCP    #Vitamin D insufficiency  Plan: Taking vit D [id number] weekly per PCP. Goal 30-60  - continue vit D 1000IU daily (she is going to add on daily MVI)     Return in about 1 year (around 3/24/2026) for thyroid follow up, sooner if new symptoms .    3/24/2025  CIARA Kirkland

## 2025-03-24 NOTE — TELEPHONE ENCOUNTER
Please enter lab orders for the patient's upcoming physical appointment.     Physical scheduled:   Your appointments       Date & Time Appointment Department ([place])    Apr 21, 2025 3:20 PM CDT Adult Physical with Princess Hopkins DO [facility], [facility] ([facility])    PLEASE NOTE - Most insurances allow a Complete Physical once every 366 days. Please schedule accordingly.    Please arrive 15 minutes prior to your scheduled appointment. Please also bring your Insurance card, Photo ID, and your medication bottles or a list of your current medication.    If you no longer require this appointment, please contact your physician office to cancel.              [facility], [facility] [place]  [address] [place] [id number]  [phone]           Preferred lab: [facility] ([facility])     The patient has been notified to complete fasting labs prior to their physical appointment.

## 2025-03-24 NOTE — PATIENT INSTRUCTIONS
Follow up with CIARA Kirkland: Return in about 1 year (around 3/24/2026) for thyroid follow up.    PLAN:  Medication: Thyroid Meds: none     Please complete thyroid blood work (does not need to be fasting):  [] Today/this week  [] 4 weeks  [] 6 weeks  [] 2 months  [] 3 months  [] 4 months  [x] 6 months    Imaging/testing:   [] No further imaging/testing needed   [] Uptake and scan ordered   [x] Thyroid ultrasound ordered  [] FNA biopsy      To schedule tests:   Call Central [phone] for FNA and uptake and scan scheduling  Thyroid ultrasounds can be scheduled on the [employer] [facility].   [facility] is another affordable option in the area to complete ultrasound- you can call them directly to schedule, they will have our order in the system already.  [phone]  Blood tests can be done at any [facility] lab location, or at [facility] if that is what you requested.  Most of the labs are walk in meaning you don't need an appointment. You can schedule these on the [employer] [facility] a walk in lab to have an appointment ready.  Here are the lab locations: [email or web address]       [facility] - CIARA Kirkland  Office phone number: [phone]  Fax number: [phone]

## 2025-04-21 ENCOUNTER — OFFICE VISIT (OUTPATIENT)
Dept: FAMILY MEDICINE CLINIC | Facility: CLINIC | Age: 40
End: 2025-04-21
Payer: COMMERCIAL

## 2025-04-21 VITALS
RESPIRATION RATE: 16 BRPM | WEIGHT: 137.38 LBS | HEIGHT: 63.98 IN | SYSTOLIC BLOOD PRESSURE: 92 MMHG | OXYGEN SATURATION: 99 % | HEART RATE: 88 BPM | BODY MASS INDEX: 23.45 KG/M2 | DIASTOLIC BLOOD PRESSURE: 60 MMHG

## 2025-04-21 DIAGNOSIS — E55.9 VITAMIN D DEFICIENCY: ICD-10-CM

## 2025-04-21 DIAGNOSIS — E61.1 IRON DEFICIENCY: ICD-10-CM

## 2025-04-21 DIAGNOSIS — Z00.00 ROUTINE HEALTH MAINTENANCE: Primary | ICD-10-CM

## 2025-04-21 PROCEDURE — 3074F SYST BP LT 130 MM HG: CPT | Performed by: FAMILY MEDICINE

## 2025-04-21 PROCEDURE — 3008F BODY MASS INDEX DOCD: CPT | Performed by: FAMILY MEDICINE

## 2025-04-21 PROCEDURE — 3078F DIAST BP <80 MM HG: CPT | Performed by: FAMILY MEDICINE

## 2025-04-21 PROCEDURE — 99396 PREV VISIT EST AGE 40-64: CPT | Performed by: FAMILY MEDICINE

## 2025-04-21 NOTE — PROGRESS NOTES
SUBJECTIVE:  Chief Complaint   Patient presents with    Physical     Women's wellness annual examination     HPI:  History of Present Illness  Caroline Rizo is a 40-year-old female presenting for routine follow-up regarding thyroid management and menstrual changes.    She feels better overall compared to last year, attributing improvements to resumed physical activity and dietary changes. Her thyroid levels, previously a concern, have stabilized. She describes the issue as an 'inflammation thing' and feels positive about the current state of her thyroid health.    She mentions a past episode of elevated blood sugar, but her A1c levels have been normal. She attributes improvements to dietary changes.    She is scheduled for an ultrasound on one side due to a persistent but less noticeable issue compared to last year. She has not yet completed this due to scheduling conflicts.    She reports changes in her menstrual cycle over the past year, with increased clotting on the second day of her period, requiring frequent tampon changes due to pressure rather than fullness. The bleeding tapers off by the third day and is minimal by the fourth day. She has tried paraffin and Tylenol with some success. She has a history of anemia and is generally lower in iron. She is currently taking a multivitamin and has previously been low in vitamin D. She is awaiting lab results to determine if additional supplementation is needed.    No current concerns about depression or anxiety, noting an improvement in her mental health compared to last year. She attributes this to reduced work stress and her children being older.    Her maternal aunt was diagnosed with breast cancer a year ago but is now in remission. The cancer was not genetic. There is no family history of colon cancer.      Chronic conditions:  Anemia: Usually iron deficient.   Hyperthyroidism: Dx as a teenager. Previously on meds (tapazole) but did not need  anymore. Last year with transitory abnormal thyroid function. Following with endocrinology. No longer on meds. TSH stabilized.      Health Maintenance:  Vaccines: reviewed as below. Indicated today: none  Immunization History   Administered Date(s) Administered    Covid-19 Vaccine Moderna 100 mcg/0.5 ml 02/12/2021, 03/12/2021, 11/19/2021    Influenza 10/17/2020, 10/09/2021, 09/16/2022    TDAP 08/01/2010, 06/10/2015, 04/28/2017, 04/01/2021     Obesity screening: Body mass index is 23.6 kg/m².  Diabetes screening:    Lab Results   Component Value Date     05/04/2024    A1C 5.2 03/24/2025      Hypercholesterolemia screening:   Lab Results   Component Value Date    HDL 44 03/27/2024     Lab Results   Component Value Date    LDL 85 03/27/2024     Lab Results   Component Value Date    TRIG 79 03/27/2024        Depression screen: no concerns  Osteoporosis: No history of pathologic fractures. No steroid use, smoking, risk of falls, excessive alcohol use.  Cervical Cancer screening: Last Pap: 11/29/2022    Colon Cancer screening: Family history of colon cancer? no Last colonoscopy: -   Breast Cancer screening: Family history of breast cancer? Maternal aunt; Last mammogram: 02/26/2025   STI: Desires testing for STIs? no  Tobacco use:  reports that she has never smoked. She has never used smokeless tobacco.    ROS: Negative unless stated above    HISTORY:  Past Medical History[1]   Past Surgical History[2]   Family History[3]   Short Social Hx on File[4]     Allergies:  Allergies[5]    OBJECTIVE:  PHYSICAL EXAM:  Vitals:    04/21/25 1519   BP: 92/60   BP Location: Right arm   Pulse: 88   Resp: 16   SpO2: 99%   Weight: 137 lb 6.4 oz (62.3 kg)   Height: 5' 3.98\" (1.625 m)     Physical Examination: General appearance - alert, well appearing, and in no distress and normal appearing weight  Mental status - alert, oriented to person, place, and time, normal mood, behavior, speech, dress, motor activity, and thought  processes  Ears - bilateral TM's and external ear canals normal  Neck - supple, no significant adenopathy  Chest - clear to auscultation, no wheezes, rales or rhonchi, symmetric air entry  Heart - normal rate, regular rhythm, normal S1, S2, no murmurs, rubs, clicks or gallops  Abdomen - soft, nontender, nondistended, no masses or organomegaly  Extremities - peripheral pulses normal, no pedal edema, no clubbing or cyanosis    Results  LABS  HbA1c: within normal limits    RADIOLOGY  Mammogram: dense breast tissue    ASSESSMENT & PLAN:  Caroline Rizo is a 40 year old female is here for Physical (Women's wellness annual examination)      1. Routine health maintenance (Primary)  -     CBC With Differential With Platelet; Future; Expected date: 04/21/2025  -     Comp Metabolic Panel (14); Future; Expected date: 04/21/2025  -     Lipid Panel; Future; Expected date: 04/21/2025  -     Vitamin D; Future; Expected date: 04/21/2025  2. Iron deficiency  -     Ferritin; Future; Expected date: 04/21/2025  3. Vitamin D deficiency  -     Vitamin D; Future; Expected date: 04/21/2025    Assessment & Plan  Menorrhagia  Menorrhagia with significant clotting on the second day of menstruation, tapering by the fourth day, suggesting hormonal changes typical in women in their late 30s to early 40s. Discussed treatment options: hormonal birth control, ibuprofen, Mirena IUD, and endometrial ablation if other treatments fail. Explained clot formation due to heavier bleeding.  - Order blood counts and iron levels to assess for anemia.  - Consider hormonal birth control or Mirena IUD if symptoms worsen.  - Use ibuprofen to manage bleeding and clotting.    Anemia  Anemia potentially exacerbated by menorrhagia. Emphasized monitoring iron levels and blood counts. Discussed multivitamin use and checking iron and vitamin D levels for supplementation needs.  - Order blood counts and iron levels.  - Evaluate results to determine need for  additional iron supplementation.  - Check vitamin D levels.    Thyroid disorder  Thyroid disorder previously monitored with endocrinology. Current thyroid function is well-managed. Reports feeling well and has resumed regular exercise.  - Continue monitoring thyroid function as needed.    Wellness Visit  Routine wellness visit. Reports improved overall health and well-being compared to last year. No concerns about depression or anxiety. Up to date on vaccines and Pap smears. Recent mammogram noted, with recommendation for follow-up ultrasound due to breast density. Discussed fasting before upcoming blood work for cholesterol testing.  - Continue routine health maintenance and screenings.  - Schedule follow-up ultrasound for breast density.  - Fast before blood work to include cholesterol testing.    Recording duration: 20 minutes    Call or return to clinic prn if these symptoms worsen or fail to improve as anticipated. RTC in 1 year.   Princess Hopkins DO  4/21/2025 4:04 PM    Note to Patient  The 21st Century Cures Act makes medical notes like these available to patients in the interest of transparency. However, be advised this is a medical document and is intended as ydai-fv-vceq communication; it is written in medical language and may appear blunt, direct, or contain abbreviations or verbiage that are unfamiliar. Medical documents are intended to carry relevant information, facts as evident, and the clinical opinion of the practitioner.     This report has been produced using speech recognition software and AI generated text, and may contain errors related to grammar, punctuation, spelling, words or phrases unrecognized or not translated appropriately to text; these errors may be referred to the dictating provider for further clarification and/or addendum as needed.         [1]   Past Medical History:   Anemia    thalessemia minor    Atypical nevus    Hyperthyroidism    Hypothyroidism    not currently on  medication    Infertility    Clomid X 1 cycle   [2]   Past Surgical History:  Procedure Laterality Date      09/01/2015    x3   [3]   Family History  Problem Relation Age of Onset    Anemia Mother     Diabetes Maternal Grandmother     Heart Disorder Maternal Grandmother     Diabetes Paternal Grandmother     Thyroid disease Paternal Grandmother         unsure if hypo/hyper    Cancer Paternal Grandfather         Pancreas   [4]   Social History  Socioeconomic History    Marital status:     Number of children: 3   Tobacco Use    Smoking status: Never    Smokeless tobacco: Never   Vaping Use    Vaping status: Never Used   Substance and Sexual Activity    Alcohol use: Yes     Comment: A drink or two on occasion    Drug use: No    Sexual activity: Not Currently     Partners: Male   Other Topics Concern    Caffeine Concern Yes     Comment: yimi tea nce a day / soda occ    Exercise No    Seat Belt No    Special Diet No    Stress Concern No    Weight Concern No   [5] No Known Allergies

## 2025-04-21 NOTE — PROGRESS NOTES
The following individual(s) verbally consented to be recorded using ambient AI listening technology and understand that they can each withdraw their consent to this listening technology at any point by asking the clinician to turn off or pause the recording:    Patient name: Caroline Hoyt Sallie

## 2025-05-13 ENCOUNTER — HOSPITAL ENCOUNTER (OUTPATIENT)
Dept: ULTRASOUND IMAGING | Age: 40
Discharge: HOME | End: 2025-05-13
Payer: COMMERCIAL

## 2025-05-13 DIAGNOSIS — E04.9 ENLARGED THYROID: ICD-10-CM

## 2025-05-13 PROCEDURE — 76536 US EXAM OF HEAD AND NECK: CPT

## 2025-05-25 ENCOUNTER — LAB ENCOUNTER (OUTPATIENT)
Dept: LAB | Facility: HOSPITAL | Age: 40
End: 2025-05-25
Attending: FAMILY MEDICINE
Payer: COMMERCIAL

## 2025-05-25 DIAGNOSIS — E55.9 VITAMIN D DEFICIENCY: ICD-10-CM

## 2025-05-25 DIAGNOSIS — Z00.00 ROUTINE HEALTH MAINTENANCE: ICD-10-CM

## 2025-05-25 DIAGNOSIS — E61.1 IRON DEFICIENCY: ICD-10-CM

## 2025-05-25 LAB
ALBUMIN SERPL-MCNC: 4.8 G/DL (ref 3.2–4.8)
ALBUMIN/GLOB SERPL: 1.8 {RATIO} (ref 1–2)
ALP LIVER SERPL-CCNC: 52 U/L (ref 37–98)
ALT SERPL-CCNC: 14 U/L (ref 10–49)
ANION GAP SERPL CALC-SCNC: 6 MMOL/L (ref 0–18)
AST SERPL-CCNC: 21 U/L (ref ?–34)
BASOPHILS # BLD AUTO: 0.05 X10(3) UL (ref 0–0.2)
BASOPHILS NFR BLD AUTO: 0.9 %
BILIRUB SERPL-MCNC: 0.9 MG/DL (ref 0.3–1.2)
BUN BLD-MCNC: 13 MG/DL (ref 9–23)
CALCIUM BLD-MCNC: 9.4 MG/DL (ref 8.7–10.6)
CHLORIDE SERPL-SCNC: 105 MMOL/L (ref 98–112)
CHOLEST SERPL-MCNC: 145 MG/DL (ref ?–200)
CO2 SERPL-SCNC: 27 MMOL/L (ref 21–32)
CREAT BLD-MCNC: 0.95 MG/DL (ref 0.55–1.02)
DEPRECATED HBV CORE AB SER IA-ACNC: 16 NG/ML (ref 50–306)
EGFRCR SERPLBLD CKD-EPI 2021: 77 ML/MIN/1.73M2 (ref 60–?)
EOSINOPHIL # BLD AUTO: 0.15 X10(3) UL (ref 0–0.7)
EOSINOPHIL NFR BLD AUTO: 2.6 %
ERYTHROCYTE [DISTWIDTH] IN BLOOD BY AUTOMATED COUNT: 15.5 %
EST. AVERAGE GLUCOSE BLD GHB EST-MCNC: 114 MG/DL (ref 68–126)
FASTING PATIENT LIPID ANSWER: YES
FASTING STATUS PATIENT QL REPORTED: YES
GLOBULIN PLAS-MCNC: 2.7 G/DL (ref 2–3.5)
GLUCOSE BLD-MCNC: 95 MG/DL (ref 70–99)
HBA1C MFR BLD: 5.6 % (ref ?–5.7)
HCT VFR BLD AUTO: 32.9 % (ref 35–48)
HDLC SERPL-MCNC: 41 MG/DL (ref 40–59)
HGB BLD-MCNC: 10.5 G/DL (ref 12–16)
IMM GRANULOCYTES # BLD AUTO: 0.01 X10(3) UL (ref 0–1)
IMM GRANULOCYTES NFR BLD: 0.2 %
LDLC SERPL CALC-MCNC: 92 MG/DL (ref ?–100)
LYMPHOCYTES # BLD AUTO: 1.84 X10(3) UL (ref 1–4)
LYMPHOCYTES NFR BLD AUTO: 31.3 %
MCH RBC QN AUTO: 19.7 PG (ref 26–34)
MCHC RBC AUTO-ENTMCNC: 31.9 G/DL (ref 31–37)
MCV RBC AUTO: 61.7 FL (ref 80–100)
MONOCYTES # BLD AUTO: 0.43 X10(3) UL (ref 0.1–1)
MONOCYTES NFR BLD AUTO: 7.3 %
NEUTROPHILS # BLD AUTO: 3.39 X10 (3) UL (ref 1.5–7.7)
NEUTROPHILS # BLD AUTO: 3.39 X10(3) UL (ref 1.5–7.7)
NEUTROPHILS NFR BLD AUTO: 57.7 %
NONHDLC SERPL-MCNC: 104 MG/DL (ref ?–130)
OSMOLALITY SERPL CALC.SUM OF ELEC: 286 MOSM/KG (ref 275–295)
PLATELET # BLD AUTO: 233 10(3)UL (ref 150–450)
PLATELETS.RETICULATED NFR BLD AUTO: 4.8 % (ref 0–7)
POTASSIUM SERPL-SCNC: 4.6 MMOL/L (ref 3.5–5.1)
PROT SERPL-MCNC: 7.5 G/DL (ref 5.7–8.2)
RBC # BLD AUTO: 5.33 X10(6)UL (ref 3.8–5.3)
SODIUM SERPL-SCNC: 138 MMOL/L (ref 136–145)
TRIGL SERPL-MCNC: 59 MG/DL (ref 30–149)
TSI SER-ACNC: 1.28 UIU/ML (ref 0.55–4.78)
VIT D+METAB SERPL-MCNC: 37 NG/ML (ref 30–100)
VIT D+METAB SERPL-MCNC: 39.8 NG/ML (ref 30–100)
VLDLC SERPL CALC-MCNC: 10 MG/DL (ref 0–30)
WBC # BLD AUTO: 5.9 X10(3) UL (ref 4–11)

## 2025-05-25 PROCEDURE — 83036 HEMOGLOBIN GLYCOSYLATED A1C: CPT | Performed by: FAMILY MEDICINE

## 2025-06-10 ENCOUNTER — PATIENT MESSAGE (OUTPATIENT)
Dept: FAMILY MEDICINE CLINIC | Facility: CLINIC | Age: 40
End: 2025-06-10

## 2025-07-10 ENCOUNTER — OFFICE VISIT (OUTPATIENT)
Dept: FAMILY MEDICINE CLINIC | Facility: CLINIC | Age: 40
End: 2025-07-10
Payer: COMMERCIAL

## 2025-07-10 VITALS
DIASTOLIC BLOOD PRESSURE: 60 MMHG | WEIGHT: 135 LBS | SYSTOLIC BLOOD PRESSURE: 102 MMHG | BODY MASS INDEX: 23 KG/M2 | HEART RATE: 81 BPM | OXYGEN SATURATION: 99 % | RESPIRATION RATE: 16 BRPM

## 2025-07-10 DIAGNOSIS — K62.89 RECTAL PAIN: ICD-10-CM

## 2025-07-10 DIAGNOSIS — R10.2 PELVIC PAIN: Primary | ICD-10-CM

## 2025-07-10 DIAGNOSIS — R19.7 DIARRHEA, UNSPECIFIED TYPE: ICD-10-CM

## 2025-07-10 LAB
APPEARANCE: CLEAR
BILIRUBIN: NEGATIVE
GLUCOSE (URINE DIPSTICK): NEGATIVE MG/DL
KETONES (URINE DIPSTICK): NEGATIVE MG/DL
LEUKOCYTES: NEGATIVE
MULTISTIX LOT#: NORMAL NUMERIC
NITRITE, URINE: NEGATIVE
OCCULT BLOOD: NEGATIVE
PH, URINE: 7 (ref 4.5–8)
PROTEIN (URINE DIPSTICK): NEGATIVE MG/DL
SPECIFIC GRAVITY: 1.01 (ref 1–1.03)
URINE-COLOR: YELLOW
UROBILINOGEN,SEMI-QN: 0.2 MG/DL (ref 0–1.9)

## 2025-07-10 PROCEDURE — 3078F DIAST BP <80 MM HG: CPT | Performed by: FAMILY MEDICINE

## 2025-07-10 PROCEDURE — 81003 URINALYSIS AUTO W/O SCOPE: CPT | Performed by: FAMILY MEDICINE

## 2025-07-10 PROCEDURE — 3074F SYST BP LT 130 MM HG: CPT | Performed by: FAMILY MEDICINE

## 2025-07-10 PROCEDURE — 99213 OFFICE O/P EST LOW 20 MIN: CPT | Performed by: FAMILY MEDICINE

## 2025-07-10 NOTE — PROGRESS NOTES
Chief Complaint:  Chief Complaint   Patient presents with    Abdominal Pain     Started 2025 Lower abdomen     Rectal Pain     Inflammation/ pressure feeling   Loose stool   Painful when using restroom       HPI:  This is a 40 year old female patient presenting for Abdominal Pain (Started 2025 Lower abdomen ) and Rectal Pain (Inflammation/ pressure feeling /Loose stool /Painful when using restroom)    History of Present Illness  Caroline Rizo is a 40 year old female who presents with lower abdominal pain and rectal discomfort.    She began experiencing new onset lower abdominal pain and rectal discomfort on  while in [place]. The pain is intense, located at the top of her lower abdomen, and coincided with the start of her menstrual period. Despite using pain relievers and a heating pad, the pain persisted.    The pain radiates to her rectum, causing difficulty with bowel movements. She experienced loose stools, which she sometimes has during her period, but this time it was accompanied by a sensation of inflammation and pulsating pain. This discomfort lasted for about three days without any bleeding or fever.    After her period ended, she continued to feel tenderness in her lower abdomen and periodic shooting pains towards her rectum. She describes a sensation of pressure in the area, which is unusual for her. The pain is not positional and occurs whether she is sitting or standing.    She has no history of endometriosis and reports that her periods can vary in heaviness, sometimes with clotting, but this recent period was lighter than usual. She has had ovarian cysts removed in the past during a .    No changes in urination, such as frequency or burning, and her stools were looser than usual even before the onset of her current symptoms. No constipation or harder stools. No fevers or chills, but her abdomen remains tender, though less so than initially. She is concerned  about the persistent sensation of inflammation and pressure in her lower abdomen.    Health Maintenance:  Health Maintenance   Topic Date Due    COVID-19 Vaccine (4 - 2024-25 season) 09/01/2024    Influenza Vaccine (1) 10/01/2025    Mammogram  02/26/2026    Annual Physical  04/21/2026    Pap Smear  11/29/2027    DTaP,Tdap,and Td Vaccines (5 - Td or Tdap) 04/01/2031    Annual Depression Screening  Completed    Pneumococcal Vaccine: Birth to 50yrs  Aged Out    Meningococcal B Vaccine  Aged Out       ROS: Review of systems performed and negative unless stated in HPI.    Past medical, family and social history reviewed and listed as below.    HISTORY:  Past Medical History[1]   Past Surgical History[2]   Family History[3]   Short Social Hx on File[4]     Current Medications[5]  Allergies:  Allergies[6]    EXAM:  Vitals:    07/10/25 1211   BP: 102/60   Pulse: 81   Resp: 16   SpO2: 99%   Weight: 135 lb (61.2 kg)     GENERAL: vitals reviewed and listed above, alert, oriented, appears well hydrated and in no acute distress  HEENT: atraumatic, conjunctiva clear, no obvious abnormalities on inspection   LUNGS: clear to auscultation bilaterally, no wheezes, rales or rhonchi, good air movement  CV: HRRR, no murmurs, no peripheral edema   ABD: Soft, BSx4, tenderness to suprapubic and B/L lower quadrants, non-distended, no guarding, +rebound tenderness\  EXTREMITIES: warm and well perfused  PSYCH: pleasant and cooperative, no obvious depression or anxiety    ASSESSMENT AND PLAN:  Discussed the following assessment   1. Pelvic pain (Primary)  -     CT ABDOMEN+PELVIS(CONTRAST ONLY)(CPT=74177); Future; Expected date: 07/10/2025  -     Urine Dip, auto without Micro  2. Rectal pain  -     CT ABDOMEN+PELVIS(CONTRAST ONLY)(CPT=74177); Future; Expected date: 07/10/2025  3. Diarrhea, unspecified type  -     CT ABDOMEN+PELVIS(CONTRAST ONLY)(CPT=74177); Future; Expected date: 07/10/2025      Advised the following:  Assessment & Plan  Pelvic  Pain  Intermittent pelvic pain with shooting pain to the rectum, tenderness, and pressure sensation since 2025, coinciding with menstruation. Severe pain persisted for three days, with ongoing tenderness and pressure. Differential diagnosis includes ruptured ovarian cyst with free fluid in the pelvis, pelvic floor spasms, and possible inflammation around the rectum or end of the colon. No fever, chills, or urinary symptoms. No endometriosis. Previous ovarian cyst removal during . CT scan preferred over pelvic ultrasound to assess for free fluid, inflammation, or other abnormalities, especially given bowel symptoms and need for comprehensive evaluation.  - Order CT scan of the abdomen and pelvis to assess for free fluid, inflammation, or other abnormalities.  - Obtain urine sample to rule out urinary tract infection.  - Coordinate CT scan scheduling for tomorrow or Saturday, with preference for tomorrow.  - Ensure urine sample is collected during the visit- negative    Low Ferritin  Low ferritin levels likely related to menstrual bleeding. Not contributing to current symptoms.    Recording duration: 15 minutes    Princess Hopkins DO  7/10/2025 12:23 PM  Family Medicine    Note to Patient  The  Century Cures Act makes medical notes like these available to patients in the interest of transparency. However, be advised this is a medical document and is intended as aasi-wy-fcxy communication; it is written in medical language and may appear blunt, direct, or contain abbreviations or verbiage that are unfamiliar. Medical documents are intended to carry relevant information, facts as evident, and the clinical opinion of the practitioner.     This report has been produced using speech recognition software and [employer] technology, and may contain errors related to grammar, punctuation, spelling, words or phrases unrecognized or not translated appropriately to text; these errors may be referred to the  dictating provider for further clarification and/or addendum as needed.         [1]   Past Medical History:   Anemia    thalessemia minor    Atypical nevus    Hyperthyroidism    Hypothyroidism    not currently on medication    Infertility    Clomid X 1 cycle   [2]   Past Surgical History:  Procedure Laterality Date      09/01/2015    x3   [3]   Family History  Problem Relation Age of Onset    Anemia Mother     Diabetes Maternal Grandmother     Heart Disorder Maternal Grandmother     Diabetes Paternal Grandmother     Thyroid disease Paternal Grandmother         unsure if hypo/hyper    Cancer Paternal Grandfather         Pancreas   [4]   Social History  Socioeconomic History    Marital status:     Number of children: 3   Tobacco Use    Smoking status: Never    Smokeless tobacco: Never   Vaping Use    Vaping status: Never Used   Substance and Sexual Activity    Alcohol use: Yes     Comment: A drink or two on occasion    Drug use: No    Sexual activity: Not Currently     Partners: Male   Other Topics Concern    Caffeine Concern Yes     Comment: yimi tea nce a day / soda occ    Exercise No    Seat Belt No    Special Diet No    Stress Concern No    Weight Concern No   [5]   No current outpatient medications on file.   [6] No Known Allergies

## 2025-07-10 NOTE — PROGRESS NOTES
Central scheduling called for STAT CT abd pelvis. To  contrast at EDW today and have scan tomorrow at 11:30 am. Pt verbalized understanding.

## 2025-07-11 ENCOUNTER — HOSPITAL ENCOUNTER (OUTPATIENT)
Dept: CT IMAGING | Facility: HOSPITAL | Age: 40
Discharge: HOME | End: 2025-07-11
Attending: FAMILY MEDICINE
Payer: COMMERCIAL

## 2025-07-11 DIAGNOSIS — R19.7 DIARRHEA, UNSPECIFIED TYPE: ICD-10-CM

## 2025-07-11 DIAGNOSIS — R10.2 PELVIC PAIN: ICD-10-CM

## 2025-07-11 DIAGNOSIS — K62.89 RECTAL PAIN: ICD-10-CM

## 2025-07-11 LAB
CREAT BLD-MCNC: 0.9 MG/DL (ref 0.55–1.02)
EGFRCR SERPLBLD CKD-EPI 2021: 82 ML/MIN/1.73M2 (ref 60–?)

## 2025-07-11 PROCEDURE — 82565 ASSAY OF CREATININE: CPT

## 2025-07-11 PROCEDURE — 74177 CT ABD & PELVIS W/CONTRAST: CPT | Performed by: FAMILY MEDICINE

## 2025-07-14 ENCOUNTER — RESULTS FOLLOW-UP (OUTPATIENT)
Dept: FAMILY MEDICINE CLINIC | Facility: CLINIC | Age: 40
End: 2025-07-14

## 2025-07-14 DIAGNOSIS — N83.8 OVARIAN MASS, LEFT: Primary | ICD-10-CM

## 2025-08-12 ENCOUNTER — OFFICE VISIT (OUTPATIENT)
Dept: FAMILY MEDICINE CLINIC | Facility: CLINIC | Age: 40
End: 2025-08-12

## 2025-08-12 VITALS
WEIGHT: 142 LBS | RESPIRATION RATE: 16 BRPM | HEART RATE: 88 BPM | OXYGEN SATURATION: 99 % | DIASTOLIC BLOOD PRESSURE: 58 MMHG | SYSTOLIC BLOOD PRESSURE: 90 MMHG | BODY MASS INDEX: 24 KG/M2

## 2025-08-12 DIAGNOSIS — M54.2 NECK PAIN: ICD-10-CM

## 2025-08-12 DIAGNOSIS — M26.69 TMJ INFLAMMATION: ICD-10-CM

## 2025-08-12 DIAGNOSIS — M62.830 SPASM OF THORACIC BACK MUSCLE: Primary | ICD-10-CM

## 2025-08-12 PROCEDURE — 99213 OFFICE O/P EST LOW 20 MIN: CPT | Performed by: FAMILY MEDICINE

## 2025-08-12 PROCEDURE — 3074F SYST BP LT 130 MM HG: CPT | Performed by: FAMILY MEDICINE

## 2025-08-12 PROCEDURE — 3078F DIAST BP <80 MM HG: CPT | Performed by: FAMILY MEDICINE

## 2025-08-12 RX ORDER — CYCLOBENZAPRINE HCL 5 MG
5 TABLET ORAL 3 TIMES DAILY PRN
Qty: 30 TABLET | Refills: 0 | Status: SHIPPED | OUTPATIENT
Start: 2025-08-12 | End: 2025-08-26 | Stop reason: ALTCHOICE

## 2025-08-12 RX ORDER — METHYLPREDNISOLONE 4 MG/1
TABLET ORAL
Qty: 21 EACH | Refills: 0 | Status: SHIPPED | OUTPATIENT
Start: 2025-08-12 | End: 2025-08-26 | Stop reason: ALTCHOICE

## 2025-08-14 ENCOUNTER — PATIENT MESSAGE (OUTPATIENT)
Dept: FAMILY MEDICINE CLINIC | Facility: CLINIC | Age: 40
End: 2025-08-14

## 2025-08-15 ENCOUNTER — TELEPHONE (OUTPATIENT)
Dept: FAMILY MEDICINE CLINIC | Facility: CLINIC | Age: 40
End: 2025-08-15

## 2025-08-15 DIAGNOSIS — M26.69 TMJ INFLAMMATION: ICD-10-CM

## 2025-08-15 DIAGNOSIS — M54.2 NECK PAIN: ICD-10-CM

## 2025-08-15 DIAGNOSIS — M62.830 SPASM OF THORACIC BACK MUSCLE: Primary | ICD-10-CM

## 2025-08-19 RX ORDER — DIAZEPAM 2 MG/1
TABLET ORAL EVERY 8 HOURS PRN
Qty: 12 TABLET | Refills: 0 | Status: SHIPPED | OUTPATIENT
Start: 2025-08-19

## 2025-08-22 RX ORDER — GABAPENTIN 100 MG/1
100 CAPSULE ORAL 3 TIMES DAILY
Qty: 90 CAPSULE | Refills: 0 | Status: SHIPPED | OUTPATIENT
Start: 2025-08-22

## 2025-08-24 ENCOUNTER — HOSPITAL ENCOUNTER (OUTPATIENT)
Dept: ULTRASOUND IMAGING | Age: 40
Discharge: HOME | End: 2025-08-24
Attending: FAMILY MEDICINE

## 2025-08-24 ENCOUNTER — HOSPITAL ENCOUNTER (OUTPATIENT)
Dept: ULTRASOUND IMAGING | Age: 40
End: 2025-08-24
Attending: FAMILY MEDICINE

## 2025-08-24 DIAGNOSIS — N83.8 OVARIAN MASS, LEFT: ICD-10-CM

## 2025-08-24 PROCEDURE — 76856 US EXAM PELVIC COMPLETE: CPT | Performed by: FAMILY MEDICINE

## 2025-08-24 PROCEDURE — 76830 TRANSVAGINAL US NON-OB: CPT | Performed by: FAMILY MEDICINE

## 2025-08-26 ENCOUNTER — OFFICE VISIT (OUTPATIENT)
Dept: FAMILY MEDICINE CLINIC | Facility: CLINIC | Age: 40
End: 2025-08-26

## 2025-08-26 VITALS
DIASTOLIC BLOOD PRESSURE: 60 MMHG | WEIGHT: 140 LBS | OXYGEN SATURATION: 98 % | BODY MASS INDEX: 24 KG/M2 | RESPIRATION RATE: 16 BRPM | HEART RATE: 100 BPM | SYSTOLIC BLOOD PRESSURE: 112 MMHG

## 2025-08-26 DIAGNOSIS — M54.2 NECK PAIN: ICD-10-CM

## 2025-08-26 DIAGNOSIS — M54.6 ACUTE RIGHT-SIDED THORACIC BACK PAIN: Primary | ICD-10-CM

## 2025-08-26 DIAGNOSIS — M62.830 SPASM OF THORACIC BACK MUSCLE: ICD-10-CM

## 2025-08-26 DIAGNOSIS — N83.8 OVARIAN MASS, LEFT: ICD-10-CM

## 2025-08-26 DIAGNOSIS — M26.69 TMJ INFLAMMATION: ICD-10-CM

## 2025-08-26 PROCEDURE — 99214 OFFICE O/P EST MOD 30 MIN: CPT | Performed by: FAMILY MEDICINE

## 2025-08-26 PROCEDURE — 3078F DIAST BP <80 MM HG: CPT | Performed by: FAMILY MEDICINE

## 2025-08-26 PROCEDURE — 3074F SYST BP LT 130 MM HG: CPT | Performed by: FAMILY MEDICINE

## 2025-08-28 ENCOUNTER — TELEPHONE (OUTPATIENT)
Dept: PHYSICAL THERAPY | Facility: HOSPITAL | Age: 40
End: 2025-08-28

## (undated) DEVICE — Device

## (undated) DEVICE — LARGE, DISPOSABLE ALEXIS O C-SECTION PROTECTOR - RETRACTOR: Brand: ALEXIS ® O C-SECTION PROTECTOR - RETRACTOR

## (undated) NOTE — LETTER
[facility]  [address] [facility], [address] [place] [address]    Consent for Operation    Date: __________________    Time: _______________    1.  I authorize the performance upon Desirae Shine the following operation:                              Nyasia Del Valle original videotape. [facility] will not be responsible for storage or maintenance of this tape. 6. For the purpose of advancing medical education, I consent to the admittance of observers to the Operating Room.     7. I authorize the use of any specime ___________________________    When the patient is a minor or mentally incompetent to give consent:  Signature of person authorized to consent for patient: ___________________________   Relationship to patient: _____________________________________________ understand how the anesthesia medicine will help me (benefits). 4. I understand that with any type of anesthesia medicine there are risks:  a.  The most common risks are: nausea, vomiting, sore throat, muscle soreness, damage to my eyes, mouth, or teeth services.     _____________________________________________________________________________  Witness        Date   Time  I have verified that the signature is that of the patient or patient’s representative, and that it was signed before the procedure    Pa

## (undated) NOTE — Clinical Note
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 Yash Kirkland Assess iron stores (serum ferritin studies) to rule out concurrent iron deficiency Follow-up ultrasound at 28 weeks to reevaluate th

## (undated) NOTE — MR AVS SNAPSHOT
After Visit Summary   2017    Derrick Chacon    MRN: [id number]           Visit Information        Provider Department Dept Phone    2017  2:00 PM [facility] PNORM1   Outpt [phone]      Your Vitals Were     BP Pulse Wt LMP [address] [facility]. [address]: [phone]  [address]:  [phone]  [address] [place] [address] [place]: [phone]  [facility].  [address] [place]: [phone]  [facility], [address] [place] [address] [email or web address]

## (undated) NOTE — Clinical Note
Hi Dr. Shanae Velazquez, we are going to repeat TFTs in another 2 weeks to confirm trends, will likely start low dose methimazole. Thanks!  Hope all is well-- Samantha

## (undated) NOTE — MR AVS SNAPSHOT
After Visit Summary   2017    Sonya Lehman    MRN: [id number]           Visit Information        Provider Department Dept Phone    2017  3:00 PM [address] PNORM1   Outpt [phone]      Your Vitals Were     BP Pulse Wt LMP Unless your physician has indicated otherwise, please contact one of the following locations to schedule your imaging test.     [address] [facility]. [facility] [address]: [phone]  [address]:  [phone]  24 experience and are looking for ways to make improvements. Your feedback will help us do so. For more information on [employer], please visit [email or web address]

## (undated) NOTE — IP AVS SNAPSHOT
[facility] [place] [address] [place], [address] ~ [phone]                Discharge Summary   6/16/2017    Gretel Lopez           Admission Information        Provider Department    6/16/2017 Meliton Sexton MD  2sw-[id number] Preeclampsia is a serious disease related to high blood pressure (hypertension). Preeclampsia/hypertension can happen during pregnancy and up to 6 weeks following childbirth.   Contact your doctor or midwife immediately if you experience any of the follow 30.6 (L) (06/17/17)  65.0 (L) -- -- -- (06/17/17)  217.0 --    (06/16/17)  13.9 (H) (06/16/17)  5.43 (H) (06/16/17)  10.9 (L) (06/16/17)  34.7 (06/16/17)  63.9 (L)    (06/16/17)  231.0       (03/27/17)  9.2 (L) (03/27/17)  29.5 (L)            Recent [facility] lactation clinic information Resolved   Storage and handling of breastmilk Resolved   Supplementation guidelines Resolved   Skin to skin - benefits and techniques Resolved   Breast massage / compression / manual expression techniques Resolved Nipple shield guidelines including weaning from use, weekly infant weight checks, and pumping for milk supply support if applicable Resolved         Postpartum Education  Resolved   Safety Resolved   Review Plan of Care Resolved   Postpartum Procedures Res [phone]. - If you don’t have insurance, Marcelina Chahal has partnered with Patient [address] to help you get signed up for insurance coverage.   Patient [employer] is a Federal Navigator program that can help with your Affordab

## (undated) NOTE — LETTER
AUTHORIZATION FOR SURGICAL OPERATION OR OTHER PROCEDURE    1. I hereby authorize Dr. Regan Rodriguez  and [facility] staff assigned to my case to perform the following operation and/or procedure at the [facility]:    _______________________________________________________________________________________________    Cortisone injection left foot   _______________________________________________________________________________________________    2.  My physician has explained the nature and purpose of the operation or other procedure, possible alternative methods of treatment, the risks involved, and the possibility of complication to me.  I acknowledge that no guarantee has been made as to the result that may be obtained.  3.  I recognize that, during the course of this operation, or other procedure, unforseen conditions may necessitate additional or different procedure than those listed above.  I, therefore, further authorize and request that the above named physician, his/her physician assistants or designees perform such procedures as are, in his/her professional opinion, necessary and desirable.  4.  Any tissue or organs removed in the operation or other procedure may be disposed of by and at the discretion of the [facility] and [facility].  5.  I understand that in the event of a medical emergency, I will be transported by local paramedics to [facility] or other hospital emergency department.  6.  I certify that I have read and fully understand the above consent to operation and/or other procedure.    7.  I acknowledge that my physician has explained sedation/analgesia administration to me including the risks and benefits.  I consent to the administration of sedation/analgesia as may be necessary or desirable in the judgement of my physician.    Witness signature: ___________________________________________________ Date:  ______/______/_____                     Time:  ________ A.M.  P.M.       Patient Name:  ______________________________________________________  (please print)      Patient signature:  ___________________________________________________             Relationship to Patient:           []  Parent    Responsible person                          []  Spouse  In case of minor or                    [] Other  _____________   Incompetent name:  __________________________________________________                               (please print)      _____________      Responsible person  In case of minor or  Incompetent signature:  _______________________________________________    Statement of Physician  My signature below affirms that prior to the time of the procedure, I have explained to the patient and/or his/her guardian, the risks and benefits involved in the proposed treatment and any reasonable alternative to the proposed treatment.  I have also explained the risks and benefits involved in the refusal of the proposed treatment and have answered the patient's questions.                        Date:  ______/______/_______  Provider                      Signature:  __________________________________________________________       Time:  ___________ A.M    P.M.